# Patient Record
Sex: MALE | Race: WHITE | NOT HISPANIC OR LATINO | URBAN - METROPOLITAN AREA
[De-identification: names, ages, dates, MRNs, and addresses within clinical notes are randomized per-mention and may not be internally consistent; named-entity substitution may affect disease eponyms.]

---

## 2018-05-01 ENCOUNTER — OFFICE VISIT CONVERTED (OUTPATIENT)
Dept: FAMILY MEDICINE CLINIC | Facility: CLINIC | Age: 58
End: 2018-05-01
Attending: FAMILY MEDICINE

## 2019-06-27 ENCOUNTER — OFFICE VISIT CONVERTED (OUTPATIENT)
Dept: FAMILY MEDICINE CLINIC | Facility: CLINIC | Age: 59
End: 2019-06-27
Attending: NURSE PRACTITIONER

## 2020-01-15 ENCOUNTER — HOSPITAL ENCOUNTER (OUTPATIENT)
Dept: URGENT CARE | Facility: CLINIC | Age: 60
Discharge: HOME | End: 2020-01-15
Attending: NURSE PRACTITIONER

## 2020-02-07 ENCOUNTER — OFFICE VISIT CONVERTED (OUTPATIENT)
Dept: FAMILY MEDICINE CLINIC | Facility: CLINIC | Age: 60
End: 2020-02-07
Attending: NURSE PRACTITIONER

## 2020-02-07 ENCOUNTER — HOSPITAL ENCOUNTER (OUTPATIENT)
Dept: GENERAL RADIOLOGY | Facility: HOSPITAL | Age: 60
Discharge: HOME | End: 2020-02-07
Attending: NURSE PRACTITIONER

## 2020-02-07 ENCOUNTER — CONVERSION ENCOUNTER (OUTPATIENT)
Dept: FAMILY MEDICINE CLINIC | Facility: CLINIC | Age: 60
End: 2020-02-07

## 2020-07-15 ENCOUNTER — OFFICE VISIT CONVERTED (OUTPATIENT)
Dept: FAMILY MEDICINE CLINIC | Facility: CLINIC | Age: 60
End: 2020-07-15
Attending: NURSE PRACTITIONER

## 2021-05-13 NOTE — PROGRESS NOTES
Progress Note      Patient Name: Curtis Pereyra Sr   Patient ID: [id number]   Sex: Male   YOB: 1960    Primary Care Provider: Donita FRANCOIS   Referring Provider: Donita FRANCOIS    Visit Date: July 15, 2020    Provider: PRISCILA Fatima   Location: [facility]   Location Address: [address] [place] [address], [address]  [place], [place]  [id number]   Location Phone: [phone]          Chief Complaint  · ANNUAL F/U      History Of Present Illness  Curtis Pereyra Sr is a 60 year old /White male who presents for evaluation and treatment of:      Patient is here today for an annual follow up. Says no c/c.    refused colonoscopy- willing to do cologuard    mood-doing good on current dose of effexor    htn-he does not check his b/p at home-running excellent today    vit b12/vit b6 -started initially for depression- has been taking 5-10 yrs    hearing test-states he is getting hearing aides from [facility]-states it was 1400 for 2. Hx of acostic neuroma left ear. Neurosurgery wanted to perform surgery on the left ear but he refused at the time. States he has severe hearing loss in the left ear.    he did not get labs drawn last year-discussed importance of getting labs done-states he will get the labs ordered done.    pt deferred depression screening today.       Past Medical History  Disease Name Date Onset Notes   Anxiety --  --    Depression --  --    Elevated PSA 11/13/2014 --    Hypertension --  --          Past Surgical History  Procedure Name Date Notes   Colonoscopy 10/20/2006 DIVERTICULOSIS   Hernia 4 yrs old --          Medication List  Name Date Started Instructions   Mobic 15 mg oral tablet 07/15/2020 TAKE ONE TABLET BY MOUTH DAILY FOR JOINT / FOOT PAIN   venlafaxine 225 mg oral tablet extended release 24hr 07/15/2020 TAKE ONE TABLET BY MOUTH EVERY MORNING AT THE SAME TIME EACH DAY ---TAKE WITH FOOD   Vitamin B-6 200 mg Oral tablet  take 1 tablet by oral route daily   vitamin B12 Oral 1000  "mcg  1 po per day   Zestoretic 20-25 mg oral tablet 07/15/2020 TAKE ONE TABLET BY MOUTH DAILY         Allergy List  Allergen Name Date Reaction Notes   NO KNOWN DRUG ALLERGIES --  --  --        Allergies Reconciled  Family Medical History  Disease Name Relative/Age Notes   Stroke Father/   --    Heart Disease Father/  Uncle/   --    Diabetes, unspecified type Father/   --          Social History  Finding Status Start/Stop Quantity Notes   Active but no formal exercise --  --/-- --  --    Alcohol Never --/-- --  07/15/2020 - 02/07/2020 - 06/27/2019 -     --  --/-- --  --    No known infection risk --  --/-- --  --    Tobacco Never --/-- --  --          Immunizations  NameDate Admin Mfg Trade Name Lot Number Route Inj VIS Given VIS Publication   Tdap01/01/2014 NE Not Entered  NE NE 05/07/2019    Comments: PER PATIENT         Review of Systems  · Constitutional  o Denies  o : fever, fatigue, weight loss, weight gain  · Cardiovascular  o Denies  o : lower extremity edema, claudication, chest pressure, palpitations  · Respiratory  o Denies  o : shortness of breath, wheezing, cough, hemoptysis, dyspnea on exertion  · Gastrointestinal  o Denies  o : nausea, vomiting, diarrhea, constipation, abdominal pain      Vitals  Date Time BP Position Site L\R Cuff Size HR RR TEMP (F) WT  HT  BMI kg/m2 BSA m2 O2 Sat        07/15/2020 08:41 /72 Sitting    83 - R   325lbs 6oz 6'  5\" 38.58 2.83 98 %          Physical Examination  · Constitutional  o Appearance  o : alert, in no acute distress, well developed, well-nourished  · Neck  o Thyroid  o : no thyomegaly, no palpabale masses   · Respiratory  o Auscultation of Lungs  o : normal breath sounds throughout, no wheeze, rhonchi, or crackles  · Cardiovascular  o Heart  o : Regular rate and rhythm, Normal S1,S2 , No cardiac murmers, No S3 or S4 gallop or rubs  · Skin and Subcutaneous Tissue  o General Inspection  o : no rashes, normal skin color, warm and dry  o Digits and " Nails  o : no clubbing, cyanosis, deformities or edema present, normal appearing nails  · Neurologic  o Mental Status Examination  o : alert and oriented to time, place, and person. Gait and Station: normal gait, able to stand without difficulty  · Psychiatric  o Judgement and Insight  o : judgment and insight intact  o Mood and Affect  o : normal mood and affect          Assessment  · Depression     311/F32.9  mood-doing good on current dose of Effexor-refilled today.  · Obesity     278.00/E66.9  20lb wt gain since last visit-discussed diet, exercise and wt loss.  · Vitamin D deficiency     268.9/E55.9  · Screening for depression     V79.0/Z13.89  · Screening for lipid disorders     V77.91/Z13.220  · Screening for colon cancer     V76.51/Z12.11  · Screening for prostate cancer     V76.44/Z12.5  · Anxiety     300.02/F41.1  · Hypertension     401.9/I10  b/p well controlled on zestoretic-refilled today.  · B12 deficiency     266.2/E53.8  · Screening for thyroid disorder     V77.0/Z13.29  · Routine lab draw     V72.60/Z01.89  · Hearing loss     389.9/H91.90  he had a hearing test done yesterday-he is going to purchase hearing aides. He has severe hearing loss in the left ear due to acoustic neuroma.  · Acoustic neuroma     225.1/D33.3      Plan  · Orders  o Cologuard (95021) - V76.51/Z12.11 - 07/15/2020  o Male Physical Primary Care Panel (CMP, CBC, TSH, Lipid, PSA) [facility] ([id number], 92151, 08977, 85516, 62942, ) - V77.91/Z13.220, V77.0/Z13.29, V72.60/Z01.89 - 07/15/2020  o Vitamin D (25-Hydroxy) Level (15444) - 268.9/E55.9 - 07/15/2020  o ACO-39: Current medications updated and reviewed () - - 07/15/2020  o ACO-14: Influenza immunization was not administered for reasons documented () - - 07/15/2020  o B12 Folate levels (B12FO) - 266.2/E53.8 - 07/15/2020  · Medications  o Mobic 15 mg oral tablet   SIG: TAKE ONE TABLET BY MOUTH DAILY FOR JOINT / FOOT PAIN   DISP: (30) Tablet with 5 refills  Adjusted on  07/15/2020     o venlafaxine 225 mg oral tablet extended release 24hr   SIG: TAKE ONE TABLET BY MOUTH EVERY MORNING AT THE SAME TIME EACH DAY ---TAKE WITH FOOD   DISP: (30) Unspecified with 5 refills  Adjusted on 07/15/2020     o Zestoretic 20-25 mg oral tablet   SIG: TAKE ONE TABLET BY MOUTH DAILY   DISP: (30) Tablet with 5 refills  Adjusted on 07/15/2020     o aspirin 81 mg Oral tablet,delayed release (DR/EC)   SIG: take 1 tablet (81 mg) by oral route once daily   DISP: (0) tablet with 0 refills  Discontinued on 07/15/2020     o ProAir HFA 90 mcg/actuation inhalation HFA aerosol inhaler   SIG: inhale 1 - 2 puffs (90 - 180 mcg) by inhalation route every 6 hours as needed   DISP: (1) 8.5 gm canister with 0 refills  Discontinued on 07/15/2020     o Medications have been Reconciled  o Transition of Care or Provider Policy  · Instructions  o Depression Screen completed and scanned into the EMR under the designated folder within the patient's documents.  o Take all medications as prescribed/directed.  o Patient was educated/instructed on their diagnosis, treatment and medications prior to discharge from the clinic today.  o Call the office with any concerns or questions.  o Discussed Covid-19 precautions including, but not limited to, social distancing, avoid touching your face, and hand washing.   o 1 year follow up  o 6 month follow up            Electronically Signed by: PRISCILA Fatima -Author on July 15, 2020 09:22:28 AM

## 2021-05-15 VITALS
BODY MASS INDEX: 37.19 KG/M2 | WEIGHT: 315 LBS | DIASTOLIC BLOOD PRESSURE: 72 MMHG | HEART RATE: 83 BPM | OXYGEN SATURATION: 98 % | HEIGHT: 77 IN | SYSTOLIC BLOOD PRESSURE: 125 MMHG

## 2021-05-15 VITALS
WEIGHT: 315 LBS | OXYGEN SATURATION: 94 % | BODY MASS INDEX: 37.19 KG/M2 | HEIGHT: 77 IN | HEART RATE: 84 BPM | DIASTOLIC BLOOD PRESSURE: 77 MMHG | SYSTOLIC BLOOD PRESSURE: 144 MMHG

## 2021-05-15 VITALS
DIASTOLIC BLOOD PRESSURE: 71 MMHG | OXYGEN SATURATION: 98 % | SYSTOLIC BLOOD PRESSURE: 117 MMHG | BODY MASS INDEX: 36.28 KG/M2 | WEIGHT: 307.25 LBS | HEIGHT: 77 IN | HEART RATE: 78 BPM

## 2021-05-16 VITALS
WEIGHT: 315 LBS | DIASTOLIC BLOOD PRESSURE: 77 MMHG | BODY MASS INDEX: 37.19 KG/M2 | HEIGHT: 77 IN | SYSTOLIC BLOOD PRESSURE: 108 MMHG | HEART RATE: 75 BPM

## 2024-10-06 PROCEDURE — 87086 URINE CULTURE/COLONY COUNT: CPT

## 2024-10-31 ENCOUNTER — OFFICE VISIT (OUTPATIENT)
Dept: UROLOGY | Age: 64
End: 2024-10-31
Payer: MEDICARE

## 2024-10-31 VITALS
HEIGHT: 77 IN | BODY MASS INDEX: 37.19 KG/M2 | SYSTOLIC BLOOD PRESSURE: 172 MMHG | WEIGHT: 315 LBS | DIASTOLIC BLOOD PRESSURE: 85 MMHG

## 2024-10-31 DIAGNOSIS — R31.9 HEMATURIA, UNSPECIFIED TYPE: Primary | ICD-10-CM

## 2024-10-31 DIAGNOSIS — R31.0 GROSS HEMATURIA: ICD-10-CM

## 2024-10-31 PROBLEM — I10 ESSENTIAL HYPERTENSION: Status: ACTIVE | Noted: 2021-03-19

## 2024-10-31 PROBLEM — E66.9 OBESITY: Status: ACTIVE | Noted: 2021-03-19

## 2024-10-31 PROBLEM — R06.02 SHORTNESS OF BREATH: Status: ACTIVE | Noted: 2021-03-19

## 2024-10-31 LAB
BACTERIA UR QL AUTO: ABNORMAL /HPF
BILIRUB BLD-MCNC: NEGATIVE MG/DL
CLARITY, POC: CLEAR
COLOR UR: YELLOW
EXPIRATION DATE: ABNORMAL
GLUCOSE UR STRIP-MCNC: NEGATIVE MG/DL
HYALINE CASTS UR QL AUTO: ABNORMAL /LPF
KETONES UR QL: NEGATIVE
LEUKOCYTE EST, POC: NEGATIVE
Lab: ABNORMAL
NITRITE UR-MCNC: NEGATIVE MG/ML
PH UR: 5.5 [PH] (ref 5–8)
PROT UR STRIP-MCNC: NEGATIVE MG/DL
RBC # UR STRIP: ABNORMAL /HPF
RBC # UR STRIP: ABNORMAL /UL
REF LAB TEST METHOD: ABNORMAL
SP GR UR: 1.02 (ref 1–1.03)
SQUAMOUS #/AREA URNS HPF: ABNORMAL /HPF
URINE VOLUME: NORMAL
UROBILINOGEN UR QL: ABNORMAL
WBC # UR STRIP: ABNORMAL /HPF

## 2024-10-31 PROCEDURE — 81015 MICROSCOPIC EXAM OF URINE: CPT | Performed by: NURSE PRACTITIONER

## 2024-10-31 NOTE — PROGRESS NOTES
Chief Complaint: Blood in Urine    Subjective         History of Present Illness  Curtis Pereyra is a 64 y.o. male presents to [facility] to be seen for gross hematuria.    Patient was seen in the urgent care with [facility] on 10/6/2024 with complaints of gross hematuria. The patient stated that he had labs the day before and his urine had some discoloration to it however he thought it was concentrated because he had not been drinking enough.  He apparently stated that his symptoms continued and he had informed his wife who is a retired nurse and she informed him that there was blood in the urine and told him that he needed to be seen.  Next  When he was seen at the urgent care center it was documented the patient had 3+ blood in his urine and trace protein he denied any fever chills flank or CVA pain as well as no lower abdominal pain no groin or testicular pain or discomfort was noted.      The patient does have a history of BPH and is currently taking tamsulosin for this.    He was apparently told during a colonoscopy that his prostate was normal in size.    After his evaluation at urgent care he was urged to contact his primary care provider to place a referral for his hematuria.    He states he has not seen blood in the urine for the last 3 weeks.     He states that he has some urgency with urination.     He reports frequency with urination.     He states his stream is better after starting tamsulosin.     Denies straining.    He has no hx of renal stones.    He states that his PCP ordered a ct scan which was performed at [facility] since this occurred.    We were able to pull the report for his CT scan at [facility] that was performed on 10/10/2024 CT scan revealed right kidney containing an 11 mm cyst in the midportion Bosniak 1 category.  The left kidney contains a 12 mm cyst in its midportion also Bosniak 1 category.  There are 2 subcentimeter cyst in lower pole of  left kidney that are not suspicious and there is a subcentimeter cyst in the midportion of the left kidney that is not suspicious.  There is a 9 mm cyst on the left that is Bosniak 1 category and is not suspicious.  No suspicious renal masses are seen.  Delayed imaging in the expiratory phase shows no intra calyceal filling defects.  The ureters are opacified along most of their course with no ureteral masses noted.  There is no evidence of retroperitoneal adenopathy.  No distended bowel loops are seen.  Prostatic enlargement is seen and there are bladder trabeculations noted.  Bladder wall thickening is somewhat greater posteriorly and laterally on the left compared to the right.  Consider direct visualization with cystoscopy to rule out a left bladder wall mass.  Prostatic enlargement with neurogenic bladder.  Asymmetric bladder wall thickening posterior laterally on the left.    No family hx of gu malignancies.     He is a non smoker.            Objective     Past Medical History:   Diagnosis Date    Anxiety     BPH (benign prostatic hyperplasia)     Erectile dysfunction     Hypertension     Urinary incontinence Oct 24       Past Surgical History:   Procedure Laterality Date    COLONOSCOPY           Current Outpatient Medications:     lisinopril-hydrochlorothiazide (PRINZIDE,ZESTORETIC) 20-25 MG per tablet, Take 1 tablet by mouth Daily., Disp: , Rfl:     meloxicam (MOBIC) 15 MG tablet, Take 1 tablet by mouth Daily., Disp: , Rfl:     pyridoxine (VITAMIN B-6) 25 MG tablet, Take 1 tablet by mouth Daily., Disp: , Rfl:     tamsulosin (FLOMAX) 0.4 MG capsule 24 hr capsule, Take 1 capsule by mouth Daily., Disp: , Rfl:     venlafaxine (EFFEXOR) 75 MG tablet, Take 3 tablets by mouth Daily., Disp: , Rfl:     venlafaxine XR (EFFEXOR-XR) 75 MG 24 hr capsule, Take 3 capsules by mouth Daily., Disp: , Rfl:     vitamin B-12 (cyanocobalamin) 100 MCG tablet, Take 1 tablet by mouth Daily., Disp: , Rfl:     No Known Allergies  "    Family History   Problem Relation Age of Onset    Heart disease Father        Social History     Socioeconomic History    Marital status:    Tobacco Use    Smoking status: Former     Types: Cigarettes    Smokeless tobacco: Never   Vaping Use    Vaping status: Never Used   Substance and Sexual Activity    Alcohol use: Never    Drug use: Never    Sexual activity: Not Currently     Partners: Male     Birth control/protection: None       Vital Signs:   /85   Ht 195.6 cm (77\")   Wt (!) 146 kg (321 lb)   BMI 38.07 kg/m²      Physical Exam     Result Review :   The following data was reviewed by: PRISCILA Salcedo on 10/31/2024:  Results for orders placed or performed in visit on 10/31/24   POC Urinalysis Dipstick, Automated    Collection Time: 10/31/24  1:09 PM    Specimen: Urine   Result Value Ref Range    Color Yellow Yellow, Straw, Dark Yellow, Danay    Clarity, UA Clear Clear    Specific Gravity  1.025 1.005 - 1.030    pH, Urine 5.5 5.0 - 8.0    Leukocytes Negative Negative    Nitrite, UA Negative Negative    Protein, POC Negative Negative mg/dL    Glucose, UA Negative Negative mg/dL    Ketones, UA Negative Negative    Urobilinogen, UA 0.2 E.U./dL Normal, 0.2 E.U./dL    Bilirubin Negative Negative    Blood, UA Moderate (A) Negative    Lot Number [id number]     Expiration Date 92,025    Bladder Scan    Collection Time: 10/31/24  2:16 PM   Result Value Ref Range    Urine Volume 106ml         Bladder Scan interpretation 10/31/2024    Estimation of residual urine via BVI 3000 Verathon Bladder Scan  MA/nurse performing: polly ruiz Ma   Residual Urine: 106 ml  Indication: Hematuria, unspecified type    Gross hematuria   Position: Supine  Examination: Incremental scanning of the suprapubic area using 2.0 MHz transducer using copious amounts of acoustic gel.   Findings: An anechoic area was demonstrated which represented the bladder, with measurement of residual urine as noted. I inspected this myself. In " that the residual urine was stable or insignificant, refer to plan for treatment and plan necessary at this time.          Procedures        Assessment and Plan    Diagnoses and all orders for this visit:    1. Hematuria, unspecified type (Primary)  -     POC Urinalysis Dipstick, Automated  -     Urinalysis, Microscopic Only - Urine, Clean Catch; Future  -     Urinalysis, Microscopic Only - Urine, Clean Catch  -     Bladder Scan    2. Gross hematuria  -     Cystoscopy; Future      Discussed with the patient his symptoms of gross hematuria are not identified on CT imaging.  Did recommend that the patient undergo a cystoscopy.    Will place order for cystoscopy to be performed urgently given patient's gross hematuria and potential bladder mass noted on CT scan.    Did discuss with the patient at this time technically his upper urinary tract evaluation is negative for any malignancies and his CT scan did not identify specific cause of his symptoms however he may benefit from an outlet procedure if he is with continued bothersome urinary symptoms.      I spent 15 minutes caring for Curtis on this date of service. This time includes time spent by me in the following activities:reviewing tests, obtaining and/or reviewing a separately obtained history, performing a medically appropriate examination and/or evaluation , counseling and educating the patient/family/caregiver, ordering medications, tests, or procedures, and documenting information in the medical record  Follow Up   Return for With Dr. David for Cystoscopy.  Patient was given instructions and counseling regarding his condition or for health maintenance advice. Please see specific information pulled into the AVS if appropriate.         This document has been electronically signed by PRISCILA Salcedo  October 31, 2024 14:18 EDT

## 2024-11-04 ENCOUNTER — TELEPHONE (OUTPATIENT)
Dept: UROLOGY | Age: 64
End: 2024-11-04
Payer: COMMERCIAL

## 2024-11-04 DIAGNOSIS — R31.0 GROSS HEMATURIA: Primary | ICD-10-CM

## 2024-11-04 RX ORDER — SULFAMETHOXAZOLE AND TRIMETHOPRIM 800; 160 MG/1; MG/1
TABLET ORAL
Qty: 6 TABLET | Refills: 0 | Status: SHIPPED | OUTPATIENT
Start: 2024-11-04

## 2024-11-04 NOTE — TELEPHONE ENCOUNTER
PATIENT SAID HE WAS SCHEDULED FOR A PROCEDURE WITH DR. CHAUHAN ON WEDNESDAY, AND WAS TOLD AN ANTIBIOTIC WOULD BE SENT IN TO START PRIOR.  HE SAID NOTHING HAS BEEN SENT TO [facility].    #[phone]

## 2024-11-11 ENCOUNTER — TELEPHONE (OUTPATIENT)
Dept: UROLOGY | Age: 64
End: 2024-11-11
Payer: COMMERCIAL

## 2024-11-13 ENCOUNTER — PROCEDURE VISIT (OUTPATIENT)
Dept: UROLOGY | Age: 64
End: 2024-11-13
Payer: MEDICARE

## 2024-11-13 ENCOUNTER — PREP FOR SURGERY (OUTPATIENT)
Dept: OTHER | Facility: HOSPITAL | Age: 64
End: 2024-11-13
Payer: COMMERCIAL

## 2024-11-13 VITALS — BODY MASS INDEX: 37.19 KG/M2 | WEIGHT: 315 LBS | HEIGHT: 77 IN

## 2024-11-13 DIAGNOSIS — R31.0 GROSS HEMATURIA: ICD-10-CM

## 2024-11-13 DIAGNOSIS — R31.0 GROSS HEMATURIA: Primary | ICD-10-CM

## 2024-11-13 DIAGNOSIS — N32.89 BLADDER MASS: Primary | ICD-10-CM

## 2024-11-13 NOTE — PROGRESS NOTES
Preprocedure diagnosis  Gross hematuria    Postprocedure diagnosis  Gross hematuria, bladder mass    Procedure  Flexible Cystourethroscopy    Attending surgeon  Sarah David MD    Anesthesia  2% lidocaine jelly intraurethrally    Complications  None    Indications  64 y.o. male undergoing a flexible cystoscopy for the above mentioned indications.    Notes some lower urinary tract symptoms, prescribed Flomax which she does say is helping.  Does note significant urinary urgency at times.  Informed consent was obtained.      Findings  Prostatomegaly with bilateral coapting lateral lobes; no significant median lobe; some discomfort with deflection of the cystoscope; cystoscopy revealed papillary bladder tumor with surrounding mucosal irregularity at the left bladder base and lateral wall: No other evident abnormality on today's evaluation    Procedure  The patient was placed in supine position and prepped and draped in sterile fashion with lidocaine jelly per urethra for anesthesia.  A timeout was performed.  The 14F flexible cystoscope was lubricated and gently placed through the urethra and into the bladder.  The bladder was completely visualized.  The cystoscope was retroflexed and the bladder neck visualized. Findings were as above. The scope was withdrawn and the procedure terminated.  The patient tolerated the procedure well.        Plan:  Tolerated procedure well.  Instructions provided.  Cystoscopic findings discussed with patient include findings of bladder mass, suspicious for malignancy  Recommend proceeding to OR for cystoscopy, transurethral resection of bladder tumor  Risk, benefits, and alternatives of this procedure were discussed with him at length.  Risk including but not limited to bleeding, infection, damage surrounding structure, pain, need for further procedures or management, worsening lower urinary tract symptoms, and risk of anesthesia including up to death.  Perioperative and postoperative  expectations were also discussed with him including the possibility of going home with a catheter.  Patient participated the discussion, notes understanding and agreeable to proceed.  Schedule OR  All questions addressed

## 2024-11-14 NOTE — PRE-PROCEDURE INSTRUCTIONS
PATIENT INSTRUCTED TO BE:    - NOTHING TO EAT AFTER MIDNIGHT OR CHEW, EXCEPT CAN HAVE CLEAR LIQUIDS 2 HOURS PRIOR TO SURGERY ARRIVAL TIME , NO MORE THAN 8 OZ. (NOTHING RED)     - TO HOLD ALL VITAMINS, SUPPLEMENTS, NSAIDS FOR ONE WEEK PRIOR TO THEIR SURGICAL PROCEDURE        - BATHING INSTRUCTIONS GIVEN    INSTRUCTED NO LOTIONS, JEWELRY, PIERCINGS,  NAIL POLISH, OR DEODORANT DAY OF SURGERY        -INSTRUCTED TO TAKE THE FOLLOWING MEDICATIONS THE DAY OF SURGERY WITH SIPS OF WATER:   Effexor        - DO NOT BRING ANY MEDICATIONS WITH YOU TO THE HOSPITAL THE DAY OF SURGERY, EXCEPT IF USE INHALERS. BRING INHALERS DAY OF SURGERY       - BRING CPAP OR BIPAP TO THE HOSPITAL ONLY IF YOU ARE SPENDING THE NIGHT    - DO NOT SMOKE OR VAPE 24 HOURS PRIOR TO PROCEDURE PER ANESTHESIA REQUEST     -MAKE SURE YOU HAVE A RIDE HOME OR SOMEONE TO STAY WITH YOU THE DAY OF THE PROCEDURE AFTER YOU GO HOME     - FOLLOW ANY OTHER INSTRUCTIONS GIVEN TO YOU BY YOUR SURGEON'S OFFICE.     - COME TO ELEVATOR A, THIRD FLOOR, CHECK IN AT THE DESK FOR REGISTRATION/SURGERY   - YOU WILL RECEIVE A PHONE CALL THE DAY PRIOR TO SURGERY BETWEEN 1PM AND 4 PM WITH ARRIVAL TIME, IF YOUR SURGERY IS ON A MONDAY YOU WILL RECEIVE A CALL THE FRIDAY PRIOR TO SURGERY DATE    - BRING CASH OR CREDIT CARD FOR COPAYMENT OF MEDICATIONS AFTER SURGERY IF YOU USE THE HOSPITAL PHARMACY (MEDS TO BED)    - PREADMISSION TESTING NURSE KAMARI CHIRINOS [phone] IF HAVE ANY QUESTIONS     -PATIENT PROVIDED THE NUMBER FOR PREOP SURGICAL DEPT IF HAD QUESTIONS AFTER HOURS PRIOR TO SURGERY ([phone] .  INFORMED PT IF NO ANSWER, LEAVE A MESSAGE AND SOMEONE WILL RETURN THEIR CALL       PATIENT VERBALIZED UNDERSTANDING       Clear Liquid Diet        Find out when you need to start a clear liquid diet.   Think of “clear liquids” as anything you could read a newspaper through. This includes things like water, broth, sports drinks, or tea WITHOUT any kind of milk or cream.           Once  you are told to start a clear liquid diet, only drink these things until 2 hours before arrival to the hospital or when the hospital says to stop. Total volume limitation: 8 oz.       Clear liquids you CAN drink:   Water   Clear broth: beef, chicken, vegetable, or bone broth with nothing in it   Gatorade   Lemonade or Melchor-aid   Soda   Tea, coffee (NO cream or honey)   Jell-O (without fruit)   Popsicles (without fruit or cream)   Italian ices   Juice without pulp: apple, white, grape   You may use salt, pepper, and sugar  NO RED  NO NOODLES    Do NOT drink:   Milk or cream   Soy milk, almond milk, coconut milk, or other non-dairy drinks and   creamers   Milkshakes or smoothies   Tomato juice   Orange juice   Grapefruit juice   Cream soups or any other than broth         Clear Liquid Diet:  Do NOT eat any solid food.  Do NOT eat or suck on mints or candy.  Do NOT chew gum.  Do NOT drink thick liquids like milk or juice with pulp in it.  Do NOT add milk, cream, or anything like soy milk or almond milk to coffee or tea.

## 2024-11-15 ENCOUNTER — ANESTHESIA (OUTPATIENT)
Dept: PERIOP | Facility: HOSPITAL | Age: 64
End: 2024-11-15
Payer: MEDICARE

## 2024-11-15 ENCOUNTER — ANESTHESIA EVENT (OUTPATIENT)
Dept: PERIOP | Facility: HOSPITAL | Age: 64
End: 2024-11-15
Payer: MEDICARE

## 2024-11-15 ENCOUNTER — HOSPITAL ENCOUNTER (OUTPATIENT)
Facility: HOSPITAL | Age: 64
Setting detail: HOSPITAL OUTPATIENT SURGERY
Discharge: HOME | End: 2024-11-15
Attending: UROLOGY | Admitting: UROLOGY
Payer: MEDICARE

## 2024-11-15 VITALS
TEMPERATURE: 97 F | WEIGHT: 315 LBS | SYSTOLIC BLOOD PRESSURE: 167 MMHG | DIASTOLIC BLOOD PRESSURE: 94 MMHG | HEIGHT: 77 IN | RESPIRATION RATE: 20 BRPM | BODY MASS INDEX: 37.19 KG/M2 | HEART RATE: 91 BPM | OXYGEN SATURATION: 96 %

## 2024-11-15 DIAGNOSIS — R31.0 GROSS HEMATURIA: ICD-10-CM

## 2024-11-15 DIAGNOSIS — N32.89 BLADDER MASS: ICD-10-CM

## 2024-11-15 LAB
ANION GAP SERPL CALCULATED.3IONS-SCNC: 11.4 MMOL/L (ref 5–15)
BUN SERPL-MCNC: 19 MG/DL (ref 8–23)
BUN/CREAT SERPL: 20.7 (ref 7–25)
CALCIUM SPEC-SCNC: 9.2 MG/DL (ref 8.6–10.5)
CHLORIDE SERPL-SCNC: 104 MMOL/L (ref 98–107)
CO2 SERPL-SCNC: 25.6 MMOL/L (ref 22–29)
CREAT SERPL-MCNC: 0.92 MG/DL (ref 0.76–1.27)
EGFRCR SERPLBLD CKD-EPI 2021: 91.7 ML/MIN/1.73
GLUCOSE SERPL-MCNC: 114 MG/DL (ref 65–99)
POTASSIUM SERPL-SCNC: 4 MMOL/L (ref 3.5–5.2)
QT INTERVAL: 407 MS
QTC INTERVAL: 430 MS
SODIUM SERPL-SCNC: 141 MMOL/L (ref 136–145)

## 2024-11-15 PROCEDURE — 25010000002 LIDOCAINE PF 2% 2 % SOLUTION: Performed by: NURSE ANESTHETIST, CERTIFIED REGISTERED

## 2024-11-15 PROCEDURE — 25010000002 DEXAMETHASONE PER 1 MG: Performed by: NURSE ANESTHETIST, CERTIFIED REGISTERED

## 2024-11-15 PROCEDURE — 88307 TISSUE EXAM BY PATHOLOGIST: CPT | Performed by: UROLOGY

## 2024-11-15 PROCEDURE — S0260 H&P FOR SURGERY: HCPCS | Performed by: UROLOGY

## 2024-11-15 PROCEDURE — 25010000002 SUGAMMADEX 200 MG/2ML SOLUTION: Performed by: NURSE ANESTHETIST, CERTIFIED REGISTERED

## 2024-11-15 PROCEDURE — 25010000002 CEFAZOLIN 3 G RECONSTITUTED SOLUTION 1 EACH VIAL: Performed by: UROLOGY

## 2024-11-15 PROCEDURE — 25810000003 LACTATED RINGERS PER 1000 ML: Performed by: STUDENT IN AN ORGANIZED HEALTH CARE EDUCATION/TRAINING PROGRAM

## 2024-11-15 PROCEDURE — 25010000002 PROPOFOL 10 MG/ML EMULSION: Performed by: NURSE ANESTHETIST, CERTIFIED REGISTERED

## 2024-11-15 PROCEDURE — 25010000002 MIDAZOLAM PER 1MG: Performed by: STUDENT IN AN ORGANIZED HEALTH CARE EDUCATION/TRAINING PROGRAM

## 2024-11-15 PROCEDURE — 52240 CYSTOSCOPY AND TREATMENT: CPT | Performed by: UROLOGY

## 2024-11-15 PROCEDURE — 80048 BASIC METABOLIC PNL TOTAL CA: CPT | Performed by: ANESTHESIOLOGY

## 2024-11-15 PROCEDURE — 25010000002 ONDANSETRON PER 1 MG: Performed by: NURSE ANESTHETIST, CERTIFIED REGISTERED

## 2024-11-15 PROCEDURE — 93005 ELECTROCARDIOGRAM TRACING: CPT | Performed by: ANESTHESIOLOGY

## 2024-11-15 PROCEDURE — 25010000002 FENTANYL CITRATE (PF) 50 MCG/ML SOLUTION: Performed by: NURSE ANESTHETIST, CERTIFIED REGISTERED

## 2024-11-15 RX ORDER — PHENAZOPYRIDINE HYDROCHLORIDE 200 MG/1
200 TABLET, FILM COATED ORAL 3 TIMES DAILY PRN
Qty: 20 TABLET | Refills: 0 | Status: SHIPPED | OUTPATIENT
Start: 2024-11-15

## 2024-11-15 RX ORDER — PROPOFOL 10 MG/ML
VIAL (ML) INTRAVENOUS AS NEEDED
Status: DISCONTINUED | OUTPATIENT
Start: 2024-11-15 | End: 2024-11-15 | Stop reason: SURG

## 2024-11-15 RX ORDER — OXYBUTYNIN CHLORIDE 5 MG/1
5 TABLET ORAL 3 TIMES DAILY PRN
Qty: 30 TABLET | Refills: 0 | Status: SHIPPED | OUTPATIENT
Start: 2024-11-15

## 2024-11-15 RX ORDER — DEXAMETHASONE SODIUM PHOSPHATE 4 MG/ML
INJECTION, SOLUTION INTRA-ARTICULAR; INTRALESIONAL; INTRAMUSCULAR; INTRAVENOUS; SOFT TISSUE AS NEEDED
Status: DISCONTINUED | OUTPATIENT
Start: 2024-11-15 | End: 2024-11-15 | Stop reason: SURG

## 2024-11-15 RX ORDER — OXYCODONE AND ACETAMINOPHEN 5; 325 MG/1; MG/1
.5-2 TABLET ORAL EVERY 6 HOURS PRN
Qty: 14 TABLET | Refills: 0 | Status: SHIPPED | OUTPATIENT
Start: 2024-11-15

## 2024-11-15 RX ORDER — ROCURONIUM BROMIDE 10 MG/ML
INJECTION, SOLUTION INTRAVENOUS AS NEEDED
Status: DISCONTINUED | OUTPATIENT
Start: 2024-11-15 | End: 2024-11-15 | Stop reason: SURG

## 2024-11-15 RX ORDER — MEPERIDINE HYDROCHLORIDE 25 MG/ML
12.5 INJECTION INTRAMUSCULAR; INTRAVENOUS; SUBCUTANEOUS
Status: DISCONTINUED | OUTPATIENT
Start: 2024-11-15 | End: 2024-11-15 | Stop reason: HOSPADM

## 2024-11-15 RX ORDER — ONDANSETRON 2 MG/ML
4 INJECTION INTRAMUSCULAR; INTRAVENOUS ONCE AS NEEDED
Status: DISCONTINUED | OUTPATIENT
Start: 2024-11-15 | End: 2024-11-15 | Stop reason: HOSPADM

## 2024-11-15 RX ORDER — IBUPROFEN 600 MG/1
600 TABLET, FILM COATED ORAL EVERY 6 HOURS PRN
Status: DISCONTINUED | OUTPATIENT
Start: 2024-11-15 | End: 2024-11-15 | Stop reason: HOSPADM

## 2024-11-15 RX ORDER — ONDANSETRON 4 MG/1
4 TABLET, ORALLY DISINTEGRATING ORAL ONCE AS NEEDED
Status: DISCONTINUED | OUTPATIENT
Start: 2024-11-15 | End: 2024-11-15 | Stop reason: HOSPADM

## 2024-11-15 RX ORDER — ONDANSETRON 2 MG/ML
INJECTION INTRAMUSCULAR; INTRAVENOUS AS NEEDED
Status: DISCONTINUED | OUTPATIENT
Start: 2024-11-15 | End: 2024-11-15 | Stop reason: SURG

## 2024-11-15 RX ORDER — MAGNESIUM HYDROXIDE 1200 MG/15ML
LIQUID ORAL AS NEEDED
Status: DISCONTINUED | OUTPATIENT
Start: 2024-11-15 | End: 2024-11-15 | Stop reason: HOSPADM

## 2024-11-15 RX ORDER — ACETAMINOPHEN 500 MG
1000 TABLET ORAL ONCE
Status: COMPLETED | OUTPATIENT
Start: 2024-11-15 | End: 2024-11-15

## 2024-11-15 RX ORDER — PROMETHAZINE HYDROCHLORIDE 12.5 MG/1
12.5 TABLET ORAL ONCE AS NEEDED
Status: DISCONTINUED | OUTPATIENT
Start: 2024-11-15 | End: 2024-11-15 | Stop reason: HOSPADM

## 2024-11-15 RX ORDER — MIDAZOLAM HYDROCHLORIDE 2 MG/2ML
2 INJECTION, SOLUTION INTRAMUSCULAR; INTRAVENOUS ONCE
Status: COMPLETED | OUTPATIENT
Start: 2024-11-15 | End: 2024-11-15

## 2024-11-15 RX ORDER — OXYCODONE HYDROCHLORIDE 5 MG/1
5 TABLET ORAL
Status: DISCONTINUED | OUTPATIENT
Start: 2024-11-15 | End: 2024-11-15 | Stop reason: HOSPADM

## 2024-11-15 RX ORDER — PROMETHAZINE HYDROCHLORIDE 25 MG/1
25 SUPPOSITORY RECTAL ONCE AS NEEDED
Status: DISCONTINUED | OUTPATIENT
Start: 2024-11-15 | End: 2024-11-15 | Stop reason: HOSPADM

## 2024-11-15 RX ORDER — PROMETHAZINE HYDROCHLORIDE 12.5 MG/1
25 TABLET ORAL ONCE AS NEEDED
Status: DISCONTINUED | OUTPATIENT
Start: 2024-11-15 | End: 2024-11-15 | Stop reason: HOSPADM

## 2024-11-15 RX ORDER — LIDOCAINE HYDROCHLORIDE 20 MG/ML
INJECTION, SOLUTION EPIDURAL; INFILTRATION; INTRACAUDAL; PERINEURAL AS NEEDED
Status: DISCONTINUED | OUTPATIENT
Start: 2024-11-15 | End: 2024-11-15 | Stop reason: SURG

## 2024-11-15 RX ORDER — SODIUM CHLORIDE, SODIUM LACTATE, POTASSIUM CHLORIDE, CALCIUM CHLORIDE 600; 310; 30; 20 MG/100ML; MG/100ML; MG/100ML; MG/100ML
9 INJECTION, SOLUTION INTRAVENOUS CONTINUOUS PRN
Status: DISCONTINUED | OUTPATIENT
Start: 2024-11-15 | End: 2024-11-15 | Stop reason: HOSPADM

## 2024-11-15 RX ORDER — FENTANYL CITRATE 50 UG/ML
INJECTION, SOLUTION INTRAMUSCULAR; INTRAVENOUS AS NEEDED
Status: DISCONTINUED | OUTPATIENT
Start: 2024-11-15 | End: 2024-11-15 | Stop reason: SURG

## 2024-11-15 RX ORDER — ACETAMINOPHEN 325 MG/1
650 TABLET ORAL ONCE
Status: DISCONTINUED | OUTPATIENT
Start: 2024-11-15 | End: 2024-11-15 | Stop reason: HOSPADM

## 2024-11-15 RX ADMIN — ACETAMINOPHEN 1000 MG: 500 TABLET ORAL at 07:41

## 2024-11-15 RX ADMIN — FENTANYL CITRATE 100 MCG: 50 INJECTION, SOLUTION INTRAMUSCULAR; INTRAVENOUS at 07:46

## 2024-11-15 RX ADMIN — LIDOCAINE HYDROCHLORIDE 40 MG: 20 INJECTION, SOLUTION INTRAVENOUS at 07:46

## 2024-11-15 RX ADMIN — MIDAZOLAM HYDROCHLORIDE 2 MG: 1 INJECTION, SOLUTION INTRAMUSCULAR; INTRAVENOUS at 07:29

## 2024-11-15 RX ADMIN — SODIUM CHLORIDE, POTASSIUM CHLORIDE, SODIUM LACTATE AND CALCIUM CHLORIDE: 600; 310; 30; 20 INJECTION, SOLUTION INTRAVENOUS at 07:45

## 2024-11-15 RX ADMIN — ROCURONIUM BROMIDE 50 MG: 10 INJECTION, SOLUTION INTRAVENOUS at 07:46

## 2024-11-15 RX ADMIN — SUGAMMADEX 200 MG: 100 INJECTION, SOLUTION INTRAVENOUS at 08:45

## 2024-11-15 RX ADMIN — ONDANSETRON HYDROCHLORIDE 4 MG: 2 SOLUTION INTRAMUSCULAR; INTRAVENOUS at 08:11

## 2024-11-15 RX ADMIN — PROPOFOL 200 MG: 10 INJECTION, EMULSION INTRAVENOUS at 07:46

## 2024-11-15 RX ADMIN — DEXAMETHASONE SODIUM PHOSPHATE 4 MG: 4 INJECTION, SOLUTION INTRAMUSCULAR; INTRAVENOUS at 08:11

## 2024-11-15 RX ADMIN — SODIUM CHLORIDE 3000 MG: 9 INJECTION, SOLUTION INTRAVENOUS at 07:29

## 2024-11-15 NOTE — OP NOTE
Preoperative diagnosis  Gross hematuria, bladder tumor    Postoperative diagnosis  Gross hematuria, bladder tumor    Procedure performed  Rigid cystoscopy  Transurethral resection of bladder tumor,  >5cm ([id number])      Surgeon  Sarah David MD      Anesthesia  General    EBL  2 mL    Complications  None    Specimen  Bladder tumor for pathology (left base and lateral wall)      Findings  Cystoscopy revealed prostatomegaly with coapting lateral lobes, no significant median lobe with some intravesical prostate tissue; normal bladder capacity, no trabeculations or diverticula; normal appearing bladder mucosa other than at the left lateral trigone and wall, area of mucosal erythema with heaping of the tissue, 2 discrete papillary tumors; completely resected, total area of resection over 5 cm.  Resection adjacent to but did not involve the left ureteral orifice.  Left ureteral orifice appeared normal even at conclusion of resection; E fluxed to clear urine.    Indications  64 y.o. male agreed to undergo the above named procedure after discussion of the alternatives, risks and benefits.  He was found to have a bladder tumor on cystoscopy for work up of gross hematuria. Informed consent was obtained.      Procedure  After informed consent was obtained, the patient was taken back to the  operating suite where general anesthesia was administered.  Once the patient  was adequately anesthetized, he was placed in the dorsal lithotomy position.  His genitals were prepped and draped in the normal sterile fashion.  A rigid  cystoscope was inserted gently into the urethral meatus and passed along the  length of the urethra.  There were no strictures, stenosis, or bladder neck contracture;  the prostate was enlarged with bilateral coapting lateral lobes, some intravesical prostate tissue, no significant median lobe. Pan cystoscopy was performed in a 360-degree manner.  Upon inspection of the bladder, the patient's bladder was  noted to have normal capacity, no trabeculations or diverticula.  Left lateral trigone and base of bladder with discrete papillary tumor x 2 proximally 1-2 cm each with surrounding erythematous mucosa of irregular texture and heaping tissue.  This was the only area of abnormality.  Normal-appearing right and left ureteral orifices.   Once pan cystoscopy was completed, the cystoscope was removed and a resectoscope was inserted.  The  bladder tumors and irregular surrounding area were completely resected.  Muscle was noted to be in the resection bed after the completion of resection.  Hemostasis was performed throughout the resection with electrocautery. The  tumor was then irrigated out and passed for pathology.  Total area of resection and fulguration was approximately 6 cm.  Final inspection of the resection bed of the tumor revealed adequate  hemostasis.  The left ureteral orifice was carefully inspected after the resection and fulguration.  Again, the mucosa of the left ureteral orifice appeared normal.  Observation was performed and it was noted to effluxed clear urine.    Bladder tumor was passed off to pathology.  The scope was removed with the bladder remaining full and a 18-Kosovan Bose  catheter was inserted into the patient's bladder without difficulty and  secured with 10 mL in the balloon.  At this point the procedure was deemed  terminated.  The patient was placed back in the supine position, awoken from  anesthesia, and transferred to the PACU in good condition.    Sign:  Electronically signed by Sarah David MD, 11/15/24, 8:54 AM EST.

## 2024-11-15 NOTE — H&P
[facility]   Urology Preop H&P Note    Patient Name: Curtis Pereyra  : 1960  MRN: [id number]  Primary Care Physician:  Audelia Villafana APRN  Referring Physician: Sarah David MD  Date of admission: 11/15/2024    Subjective   Subjective     Reason for Consult/ Chief Complaint: Bladder mass [N32.89]  Gross hematuria [R31.0]    HPI:  Curtis Pereyra is a 64 y.o. male history ofBladder mass [N32.89]  Gross hematuria [R31.0] who presents for further management OR.  Presents for planned Procedure(s):  CYSTOSCOPY TRANSURETHRAL RESECTION OF BLADDER TUMOR  Plain text: Scope bladder, resect tumor;  .  Risk, benefits, and alternatives discussed with patient prior to today.All questions were addressed after providing time for discussion.  Patient denies significant changes since last visit.  No new complaints today.    Review of Systems   All systems were reviewed and negative except for the above  Personal History     Past Medical History:   Diagnosis Date    Anxiety     BPH (benign prostatic hyperplasia)     Erectile dysfunction     Hypertension     Urinary incontinence Oct 24       Past Surgical History:   Procedure Laterality Date    COLONOSCOPY         Family History: family history includes Heart disease in his father. Otherwise pertinent FHx was reviewed and not pertinent to current issue.    Social History:  reports that he has quit smoking. His smoking use included cigarettes. He has never used smokeless tobacco. He reports that he does not drink alcohol and does not use drugs.    Home Medications:  lisinopril-hydrochlorothiazide, meloxicam, pyridoxine, tamsulosin, venlafaxine XR, and vitamin B-12    Allergies:  No Known Allergies    Objective    Objective     Vitals:   Temp:  [97.9 °F (36.6 °C)] 97.9 °F (36.6 °C)  Heart Rate:  [68] 68  Resp:  [18] 18  BP: (140)/(81) 140/81    Physical Exam:   Constitutional: Awake, alert   Respiratory: Clear, nonlabored respirations    Cardiovascular: Regular  rate, no chest retractions   gastrointestinal: Appears soft, nontender     Results:    Assessment & Plan   Assessment / Plan     Brief Patient Summary:  Curtis Pereyra is a 64 y.o. male who     Active Hospital Problems:  Active Hospital Problems    Diagnosis     Bladder mass     Gross hematuria        Plan:   Proceed to the OR for planned procedure, Procedure(s):  CYSTOSCOPY TRANSURETHRAL RESECTION OF BLADDER TUMOR  Plain text: Scope bladder, resect tumor,  ,   Risk, benefits, and alternatives discussed with patient at length he is agreeable to proceed  All questions addressed      Electronically signed by Sarah David MD, 11/15/24, 7:14 AM EST.

## 2024-11-15 NOTE — DISCHARGE INSTRUCTIONS
DISCHARGE INSTRUCTIONS   TRANSURETHRAL RESECTION   OF BLADDER TUMOR            For your surgery you had:  [x] General anesthesia (you may have a sore throat for the first 24 hours)  You may experience dizziness, drowsiness, or lightheadedness for several hours following surgery.  Do not stay alone today or tonight.  Limit your activity for 24 hours.   You should not drive, operate machinery, drink alcohol, or sign legally binding documents for 24 hours or while you are taking pain medication.   Resume your diet slowly. Follow any special dietary instructions you may have been given by your doctor.     NOTIFY YOUR DOCTOR IF YOU EXPERIENCE ANY OF THE FOLLOWING:  Temperature greater than 101 degrees Fahrenheit  Shaking chills  Nausea, vomiting, and/or pain that is not controlled by prescribed medications  Increase in bleeding or bleeding that is excessive  If unable to reach your doctor, please go to the closest Emergency Room   You will have a catheter to drain your bladder. Catheters are usually removed in 24-48 hours. Wash around the catheter with soap and water and rinse well.   Drink 6 glasses of water a day for 3-4 days.   You may see blood in your urine for up to a week, there may be small blood clots. If so, increase the amount you are drinking.   If you are passing large clots, call your doctor.   Avoid lifting or straining for 4 weeks.   You may have burning, pain, and frequency of urination for 48 hours after catheter is removed. Call your doctor if it continues longer.       SPECIAL INSTRUCTIONS:  See Dr. David's instructions on After Visit Summary.      Last dose of pain medication given at:  Tylenol (1000mg) last at 7:41am. Do not exceed 4000mg of tylenol in a 24 hour period.

## 2024-11-15 NOTE — ANESTHESIA PREPROCEDURE EVALUATION
Anesthesia Evaluation     Patient summary reviewed and Nursing notes reviewed   no history of anesthetic complications:   NPO Solid Status: > 8 hours  NPO Liquid Status: > 2 hours           Airway   Mallampati: II  TM distance: >3 FB  Neck ROM: full  Possible difficult intubation  Dental - normal exam     Pulmonary - normal exam    breath sounds clear to auscultation  (+) ,sleep apnea  Cardiovascular - normal exam  Exercise tolerance: good (4-7 METS)    Rhythm: regular  Rate: normal    (+) hypertension well controlled      Neuro/Psych  (+) psychiatric history Anxiety  GI/Hepatic/Renal/Endo    (+) obesity    Musculoskeletal     Abdominal   (+) obese   Substance History - negative use     OB/GYN negative ob/gyn ROS         Other   arthritis,     ROS/Med Hx Other: PAT Nursing Notes unavailable.       Phys Exam Other:  Full Beard              Anesthesia Plan    ASA 3     general     (Patient understands anesthesia not responsible for dental damage.)  intravenous induction     Anesthetic plan, risks, benefits, and alternatives have been provided, discussed and informed consent has been obtained with: patient.    Use of blood products discussed with patient .    Plan discussed with CRNA.      CODE STATUS:

## 2024-11-15 NOTE — ANESTHESIA POSTPROCEDURE EVALUATION
Patient: Curtis QURESHI Sr Pereyra    Procedure Summary       Date: 11/15/24 Room / Location: [facility] OR  / [facility] MAIN OR    Anesthesia Start: 0742 Anesthesia Stop: 0858    Procedure: CYSTOSCOPY TRANSURETHRAL RESECTION OF BLADDER TUMOR  Plain text: Scope bladder, resect tumor Diagnosis:       Bladder mass      Gross hematuria      (Bladder mass [N32.89])      (Gross hematuria [R31.0])    Surgeons: Sarah David MD Provider: Rocky Ewing MD    Anesthesia Type: general ASA Status: 3            Anesthesia Type: general    Vitals  Vitals Value Taken Time   /78 11/15/24 0930   Temp 36.3 °C (97.3 °F) 11/15/24 0930   Pulse 93 11/15/24 0930   Resp 16 11/15/24 0930   SpO2 95 % 11/15/24 0930           Post Anesthesia Care and Evaluation    Patient location during evaluation: bedside  Patient participation: complete - patient participated  Level of consciousness: awake  Pain management: adequate    Airway patency: patent  PONV Status: none  Cardiovascular status: acceptable and stable  Respiratory status: acceptable  Hydration status: acceptable

## 2024-11-19 ENCOUNTER — TELEPHONE (OUTPATIENT)
Dept: UROLOGY | Age: 64
End: 2024-11-19
Payer: COMMERCIAL

## 2024-11-19 LAB
CYTO UR: NORMAL
LAB AP CASE REPORT: NORMAL
LAB AP CLINICAL INFORMATION: NORMAL
PATH REPORT.FINAL DX SPEC: NORMAL
PATH REPORT.GROSS SPEC: NORMAL

## 2024-11-19 NOTE — TELEPHONE ENCOUNTER
URINARY BLADDER TUMOR WITH HIGH GRADE PAPILLARY UROTHELIAL CARCINOMA, INVASIVE INTO LAMINA PROPRIA.

## 2024-11-20 ENCOUNTER — TELEPHONE (OUTPATIENT)
Dept: UROLOGY | Age: 64
End: 2024-11-20

## 2024-11-20 ENCOUNTER — OFFICE VISIT (OUTPATIENT)
Dept: UROLOGY | Age: 64
End: 2024-11-20
Payer: MEDICARE

## 2024-11-20 ENCOUNTER — PREP FOR SURGERY (OUTPATIENT)
Dept: OTHER | Facility: HOSPITAL | Age: 64
End: 2024-11-20
Payer: COMMERCIAL

## 2024-11-20 VITALS — RESPIRATION RATE: 18 BRPM | BODY MASS INDEX: 37.19 KG/M2 | WEIGHT: 315 LBS | HEIGHT: 77 IN

## 2024-11-20 DIAGNOSIS — C67.8 MALIGNANT NEOPLASM OF OVERLAPPING SITES OF BLADDER: Primary | ICD-10-CM

## 2024-11-20 DIAGNOSIS — R39.15 URINARY URGENCY: Primary | ICD-10-CM

## 2024-11-20 RX ORDER — SOLIFENACIN SUCCINATE 5 MG/1
5 TABLET, FILM COATED ORAL DAILY
Qty: 60 TABLET | Refills: 0 | Status: SHIPPED | OUTPATIENT
Start: 2024-11-20

## 2024-11-20 NOTE — PROGRESS NOTES
UROLOGY OFFICE FOLLOW UP NOTE    Subjective   HPI  Curtis QURESHI  oRddy is a 64 y.o. male.  Presents for follow-up after TURBT, 11/15/2024.  Removed Bose catheter prior to today's visit.    History of Present Illness   He is accompanied by his wife.    His catheter was removed this morning at 4:00 AM, and he has since urinated. He reports no presence of blood in his urine, although he did observe some clots in the catheter bag when he emptied it. He expresses a strong dislike for the catheter.    He is currently taking oxybutynin as needed for bladder spasms, but it does not alleviate his pain. He has resumed taking Flomax, which he had previously stopped while the catheter was in place.    Additionally, he mentions experiencing low back pain today, which he attributes to sitting at his desk for several hours.    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; muscularis propria present without invasion      Results for orders placed or performed during the hospital encounter of 11/15/24   Basic Metabolic Panel    Collection Time: 11/15/24  6:47 AM    Specimen: Blood   Result Value Ref Range    Glucose 114 (H) 65 - 99 mg/dL    BUN 19 8 - 23 mg/dL    Creatinine 0.92 0.76 - 1.27 mg/dL    Sodium 141 136 - 145 mmol/L    Potassium 4.0 3.5 - 5.2 mmol/L    Chloride 104 98 - 107 mmol/L    CO2 25.6 22.0 - 29.0 mmol/L    Calcium 9.2 8.6 - 10.5 mg/dL    BUN/Creatinine Ratio 20.7 7.0 - 25.0    Anion Gap 11.4 5.0 - 15.0 mmol/L    eGFR 91.7 >60.0 mL/min/1.73   ECG 12 Lead Pre-Op / Pre-Procedure    Collection Time: 11/15/24  7:00 AM   Result Value Ref Range    QT Interval 407 ms    QTC Interval 430 ms   Tissue Pathology Exam    Collection Time: 11/15/24  8:22 AM    Specimen: Urinary Bladder; Tissue   Result Value Ref Range    Case Report       Surgical Pathology Report                         Case: [id number]                                 "  Authorizing Provider:  Sarah David MD      Collected:           11/15/2024 08:22 AM          Ordering Location:     [facility] MAIN Received:            11/15/2024 10:53 AM                                 OR                                                                           Pathologist:           Onur Connelly MD                                                            Specimen:    Urinary Bladder, bladder tumor                                                             Clinical Information       Bladder mass  Gross hematuria      Final Diagnosis       Urinary bladder, tumor, transurethral resection:   - High grade papillary urothelial carcinoma, invasive into lamina propria   - Muscularis propria present, without invasion    REMARKS: The above positive (malignant) diagnosis was called to Judy WALLACE in Dr. TARAN David's  office at 14:22 EST on 11/19/2024 by Baron GILBERT      Gross Description       1. Urinary Bladder.  Received in formalin and labeled \"bladder tumor\" is a 1.5 cm aggregate of tan, friable soft tissue fragments.  The specimen is submitted in toto in 1 cassette.   JORDY      Microscopic Description       Microscopic examination performed.           Review of systems  A review of systems was performed, and positive findings are noted in the HPI.    Objective     Vital Signs:   Resp 18   Ht 195.6 cm (77.01\")   Wt (!) 146 kg (321 lb)   BMI 38.06 kg/m²       Physical exam  No acute distress, well-nourished  Awake alert and oriented  Mood normal; affect normal  Physical Exam      Bladder Scan interpretation 11/20/2024    Estimation of residual urine via [employer] 3000 Verathon Bladder Scan  Performed by: FILI Russ  Residual Urine: 0 ml  Indication: Malignant neoplasm of overlapping sites of bladder   Position: Supine  Examination: Incremental scanning of the suprapubic area using 2.0 MHz transducer using copious amounts of acoustic gel.   Findings: An anechoic area was demonstrated which " represented the bladder, with measurement of residual urine as noted. I inspected this myself. In that the residual urine was stable or insignificant, refer to plan for treatment and plan necessary at this time.     Problem List:  Patient Active Problem List   Diagnosis    Essential hypertension    Obesity    Shortness of breath    Bladder mass    Gross hematuria       Assessment & Plan   Diagnoses and all orders for this visit:    1. Malignant neoplasm of overlapping sites of bladder (Primary)      Emptying bladder without postvoid residual; after catheter removal    Clinically doing well after resection of bladder tumor    The patient and patient's family was counseled regarding diagnostic results, prognosis and risks and benefits of treatment options.         High Grade TI urothelial cancer. I have discussed the management of high grade T1 urothelial cancer of the bladder with the patient, including a 25-40% likelihood of detecting T2 or greater disease at re-resection. I have discussed the risks and prognosis of High grade TI disease with the patient including progression to MIBC and recurrence of HGTI.     I have discussed bladder preserving management with BCG and intravesical agents, and the risks of infection, bladder screening, recurrence and progression, as well as the need for maintenance with BCG.  Also discussed international shortage ongoing, issues with supply chain availability limiting available supply at times.    I have discussed the role of early cystectomy for patients with HGTI and the excellent overall survival for patient with HGT1 bladder cancer who have a cystectomy. I mentioned that even with HGT1 there is a 10-15% chance of being discovered to have ibrahima metastasis.      Discussed AUA guidelines including to proceed to the OR for repeat resection in 4-6 weeks.  Risk, benefits, and alternatives of this procedure were discussed with him at length.  Risk including but not limited to  bleeding, infection, damage surrounding structure, pain, need for further procedures or management, worsening lower urinary tract symptoms, and risk of anesthesia including up to death.  Perioperative and postoperative expectations were also discussed with him including the possibility of going home with a catheter.      Patient participated the discussion, notes understanding and agreeable to proceed.      Schedule OR  All questions addressed                 Assessment & Plan             Patient or patient representative verbalized consent for the use of Ambient Listening during the visit with  Sarah David MD for chart documentation. 11/20/2024  15:20 EST

## 2024-11-20 NOTE — TELEPHONE ENCOUNTER
Okay, I put an order for Vesicare to apothecary.  Possible side effects include dry mouth and worsening constipation.  Sent in the lower dose which is typically tolerated well.  Thank you

## 2024-11-20 NOTE — TELEPHONE ENCOUNTER
PT WAS SEEN IN THE OFFICE TODAY AND A MEDICATION FOR INCONTINENCE WAS OFFERED AND THE PATIENT DECLINED. HE HAS CHANGED HIS MIND AND WOULD LIKE TO TRY SOMETHING.

## 2024-11-21 ENCOUNTER — APPOINTMENT (OUTPATIENT)
Dept: CT IMAGING | Facility: HOSPITAL | Age: 64
End: 2024-11-21
Payer: MEDICARE

## 2024-11-21 ENCOUNTER — APPOINTMENT (OUTPATIENT)
Dept: GENERAL RADIOLOGY | Facility: HOSPITAL | Age: 64
End: 2024-11-21
Payer: MEDICARE

## 2024-11-21 ENCOUNTER — HOSPITAL ENCOUNTER (INPATIENT)
Facility: HOSPITAL | Age: 64
LOS: 2 days | Discharge: HOME | End: 2024-11-23
Attending: EMERGENCY MEDICINE | Admitting: INTERNAL MEDICINE
Payer: MEDICARE

## 2024-11-21 DIAGNOSIS — A41.9 SEPSIS, DUE TO UNSPECIFIED ORGANISM, UNSPECIFIED WHETHER ACUTE ORGAN DYSFUNCTION PRESENT: ICD-10-CM

## 2024-11-21 DIAGNOSIS — N39.0 URINARY TRACT INFECTION WITHOUT HEMATURIA, SITE UNSPECIFIED: Primary | ICD-10-CM

## 2024-11-21 DIAGNOSIS — N39.0 COMPLICATED UTI (URINARY TRACT INFECTION): ICD-10-CM

## 2024-11-21 LAB
ALBUMIN SERPL-MCNC: 4.3 G/DL (ref 3.5–5.2)
ALBUMIN/GLOB SERPL: 1.4 G/DL
ALP SERPL-CCNC: 89 U/L (ref 39–117)
ALT SERPL W P-5'-P-CCNC: 21 U/L (ref 1–41)
ANION GAP SERPL CALCULATED.3IONS-SCNC: 12.7 MMOL/L (ref 5–15)
AST SERPL-CCNC: 18 U/L (ref 1–40)
BACTERIA UR QL AUTO: ABNORMAL /HPF
BASOPHILS # BLD AUTO: 0.03 10*3/MM3 (ref 0–0.2)
BASOPHILS NFR BLD AUTO: 0.2 % (ref 0–1.5)
BILIRUB SERPL-MCNC: 0.7 MG/DL (ref 0–1.2)
BILIRUB UR QL STRIP: NEGATIVE
BUN SERPL-MCNC: 21 MG/DL (ref 8–23)
BUN/CREAT SERPL: 19.8 (ref 7–25)
CALCIUM SPEC-SCNC: 9.8 MG/DL (ref 8.6–10.5)
CHLORIDE SERPL-SCNC: 100 MMOL/L (ref 98–107)
CLARITY UR: ABNORMAL
CO2 SERPL-SCNC: 24.3 MMOL/L (ref 22–29)
COLOR UR: YELLOW
CREAT SERPL-MCNC: 1.06 MG/DL (ref 0.76–1.27)
D-LACTATE SERPL-SCNC: 2 MMOL/L (ref 0.5–2)
DEPRECATED RDW RBC AUTO: 40.3 FL (ref 37–54)
EGFRCR SERPLBLD CKD-EPI 2021: 77.4 ML/MIN/1.73
EOSINOPHIL # BLD AUTO: 0 10*3/MM3 (ref 0–0.4)
EOSINOPHIL NFR BLD AUTO: 0 % (ref 0.3–6.2)
ERYTHROCYTE [DISTWIDTH] IN BLOOD BY AUTOMATED COUNT: 13.1 % (ref 12.3–15.4)
FLUAV SUBTYP SPEC NAA+PROBE: NOT DETECTED
FLUBV RNA ISLT QL NAA+PROBE: NOT DETECTED
GLOBULIN UR ELPH-MCNC: 3 GM/DL
GLUCOSE SERPL-MCNC: 130 MG/DL (ref 65–99)
GLUCOSE UR STRIP-MCNC: NEGATIVE MG/DL
HCT VFR BLD AUTO: 40 % (ref 37.5–51)
HGB BLD-MCNC: 13.7 G/DL (ref 13–17.7)
HGB UR QL STRIP.AUTO: ABNORMAL
HOLD SPECIMEN: NORMAL
HYALINE CASTS UR QL AUTO: ABNORMAL /LPF
IMM GRANULOCYTES # BLD AUTO: 0.06 10*3/MM3 (ref 0–0.05)
IMM GRANULOCYTES NFR BLD AUTO: 0.4 % (ref 0–0.5)
KETONES UR QL STRIP: ABNORMAL
LEUKOCYTE ESTERASE UR QL STRIP.AUTO: ABNORMAL
LYMPHOCYTES # BLD AUTO: 0.7 10*3/MM3 (ref 0.7–3.1)
LYMPHOCYTES NFR BLD AUTO: 4.4 % (ref 19.6–45.3)
MCH RBC QN AUTO: 29.5 PG (ref 26.6–33)
MCHC RBC AUTO-ENTMCNC: 34.3 G/DL (ref 31.5–35.7)
MCV RBC AUTO: 86.2 FL (ref 79–97)
MONOCYTES # BLD AUTO: 1.08 10*3/MM3 (ref 0.1–0.9)
MONOCYTES NFR BLD AUTO: 6.8 % (ref 5–12)
NEUTROPHILS NFR BLD AUTO: 13.93 10*3/MM3 (ref 1.7–7)
NEUTROPHILS NFR BLD AUTO: 88.2 % (ref 42.7–76)
NITRITE UR QL STRIP: POSITIVE
NRBC BLD AUTO-RTO: 0 /100 WBC (ref 0–0.2)
PH UR STRIP.AUTO: 5.5 [PH] (ref 5–8)
PLATELET # BLD AUTO: 231 10*3/MM3 (ref 140–450)
PMV BLD AUTO: 8.7 FL (ref 6–12)
POTASSIUM SERPL-SCNC: 3.7 MMOL/L (ref 3.5–5.2)
PROT SERPL-MCNC: 7.3 G/DL (ref 6–8.5)
PROT UR QL STRIP: ABNORMAL
RBC # BLD AUTO: 4.64 10*6/MM3 (ref 4.14–5.8)
RBC # UR STRIP: ABNORMAL /HPF
REF LAB TEST METHOD: ABNORMAL
RSV RNA NPH QL NAA+NON-PROBE: NOT DETECTED
SARS-COV-2 RNA RESP QL NAA+PROBE: NOT DETECTED
SODIUM SERPL-SCNC: 137 MMOL/L (ref 136–145)
SP GR UR STRIP: 1.02 (ref 1–1.03)
SQUAMOUS #/AREA URNS HPF: ABNORMAL /HPF
UROBILINOGEN UR QL STRIP: ABNORMAL
WBC # UR STRIP: ABNORMAL /HPF
WBC NRBC COR # BLD AUTO: 15.8 10*3/MM3 (ref 3.4–10.8)
WHOLE BLOOD HOLD COAG: NORMAL

## 2024-11-21 PROCEDURE — 74177 CT ABD & PELVIS W/CONTRAST: CPT

## 2024-11-21 PROCEDURE — 99223 1ST HOSP IP/OBS HIGH 75: CPT | Performed by: INTERNAL MEDICINE

## 2024-11-21 PROCEDURE — 71045 X-RAY EXAM CHEST 1 VIEW: CPT

## 2024-11-21 PROCEDURE — 25810000003 SODIUM CHLORIDE 0.9 % SOLUTION: Performed by: INTERNAL MEDICINE

## 2024-11-21 PROCEDURE — 25010000002 ENOXAPARIN PER 10 MG: Performed by: INTERNAL MEDICINE

## 2024-11-21 PROCEDURE — 87186 SC STD MICRODIL/AGAR DIL: CPT | Performed by: EMERGENCY MEDICINE

## 2024-11-21 PROCEDURE — 87077 CULTURE AEROBIC IDENTIFY: CPT | Performed by: EMERGENCY MEDICINE

## 2024-11-21 PROCEDURE — 87040 BLOOD CULTURE FOR BACTERIA: CPT | Performed by: EMERGENCY MEDICINE

## 2024-11-21 PROCEDURE — 81001 URINALYSIS AUTO W/SCOPE: CPT | Performed by: EMERGENCY MEDICINE

## 2024-11-21 PROCEDURE — 85025 COMPLETE CBC W/AUTO DIFF WBC: CPT | Performed by: EMERGENCY MEDICINE

## 2024-11-21 PROCEDURE — 99291 CRITICAL CARE FIRST HOUR: CPT

## 2024-11-21 PROCEDURE — 87637 SARSCOV2&INF A&B&RSV AMP PRB: CPT | Performed by: EMERGENCY MEDICINE

## 2024-11-21 PROCEDURE — 83605 ASSAY OF LACTIC ACID: CPT | Performed by: EMERGENCY MEDICINE

## 2024-11-21 PROCEDURE — 36415 COLL VENOUS BLD VENIPUNCTURE: CPT

## 2024-11-21 PROCEDURE — 87086 URINE CULTURE/COLONY COUNT: CPT | Performed by: EMERGENCY MEDICINE

## 2024-11-21 PROCEDURE — 80053 COMPREHEN METABOLIC PANEL: CPT | Performed by: EMERGENCY MEDICINE

## 2024-11-21 PROCEDURE — 25510000001 IOPAMIDOL PER 1 ML: Performed by: EMERGENCY MEDICINE

## 2024-11-21 PROCEDURE — 25010000002 CEFTRIAXONE PER 250 MG: Performed by: EMERGENCY MEDICINE

## 2024-11-21 PROCEDURE — 99222 1ST HOSP IP/OBS MODERATE 55: CPT | Performed by: UROLOGY

## 2024-11-21 RX ORDER — TAMSULOSIN HYDROCHLORIDE 0.4 MG/1
0.4 CAPSULE ORAL NIGHTLY
Status: DISCONTINUED | OUTPATIENT
Start: 2024-11-21 | End: 2024-11-23 | Stop reason: HOSPADM

## 2024-11-21 RX ORDER — ENOXAPARIN SODIUM 100 MG/ML
40 INJECTION SUBCUTANEOUS DAILY
Status: DISCONTINUED | OUTPATIENT
Start: 2024-11-21 | End: 2024-11-23 | Stop reason: HOSPADM

## 2024-11-21 RX ORDER — CALCIUM CARBONATE 500 MG/1
2 TABLET, CHEWABLE ORAL 2 TIMES DAILY PRN
Status: DISCONTINUED | OUTPATIENT
Start: 2024-11-21 | End: 2024-11-23 | Stop reason: HOSPADM

## 2024-11-21 RX ORDER — SODIUM CHLORIDE 0.9 % (FLUSH) 0.9 %
10 SYRINGE (ML) INJECTION EVERY 12 HOURS SCHEDULED
Status: DISCONTINUED | OUTPATIENT
Start: 2024-11-21 | End: 2024-11-23 | Stop reason: HOSPADM

## 2024-11-21 RX ORDER — PHENAZOPYRIDINE HYDROCHLORIDE 100 MG/1
200 TABLET, FILM COATED ORAL 3 TIMES DAILY PRN
Status: CANCELLED | OUTPATIENT
Start: 2024-11-21

## 2024-11-21 RX ORDER — LANOLIN ALCOHOL/MO/W.PET/CERES
25 CREAM (GRAM) TOPICAL DAILY
Status: CANCELLED | OUTPATIENT
Start: 2024-11-21

## 2024-11-21 RX ORDER — OXYBUTYNIN CHLORIDE 5 MG/1
5 TABLET ORAL 3 TIMES DAILY PRN
Status: CANCELLED | OUTPATIENT
Start: 2024-11-21

## 2024-11-21 RX ORDER — HYDROCHLOROTHIAZIDE 12.5 MG/1
12.5 TABLET ORAL
Status: CANCELLED | OUTPATIENT
Start: 2024-11-21

## 2024-11-21 RX ORDER — LISINOPRIL 20 MG/1
20 TABLET ORAL
Status: DISCONTINUED | OUTPATIENT
Start: 2024-11-22 | End: 2024-11-23 | Stop reason: HOSPADM

## 2024-11-21 RX ORDER — MELOXICAM 7.5 MG/1
15 TABLET ORAL DAILY
Status: DISCONTINUED | OUTPATIENT
Start: 2024-11-21 | End: 2024-11-23 | Stop reason: HOSPADM

## 2024-11-21 RX ORDER — SODIUM CHLORIDE 9 MG/ML
100 INJECTION, SOLUTION INTRAVENOUS CONTINUOUS
Status: ACTIVE | OUTPATIENT
Start: 2024-11-21 | End: 2024-11-22

## 2024-11-21 RX ORDER — IOPAMIDOL 755 MG/ML
100 INJECTION, SOLUTION INTRAVASCULAR
Status: COMPLETED | OUTPATIENT
Start: 2024-11-21 | End: 2024-11-21

## 2024-11-21 RX ORDER — BISACODYL 5 MG/1
5 TABLET, DELAYED RELEASE ORAL DAILY PRN
Status: DISCONTINUED | OUTPATIENT
Start: 2024-11-21 | End: 2024-11-23 | Stop reason: HOSPADM

## 2024-11-21 RX ORDER — TAMSULOSIN HYDROCHLORIDE 0.4 MG/1
0.4 CAPSULE ORAL NIGHTLY
Status: CANCELLED | OUTPATIENT
Start: 2024-11-21

## 2024-11-21 RX ORDER — BISACODYL 10 MG
10 SUPPOSITORY, RECTAL RECTAL DAILY PRN
Status: DISCONTINUED | OUTPATIENT
Start: 2024-11-21 | End: 2024-11-23 | Stop reason: HOSPADM

## 2024-11-21 RX ORDER — SODIUM CHLORIDE 9 MG/ML
40 INJECTION, SOLUTION INTRAVENOUS AS NEEDED
Status: DISCONTINUED | OUTPATIENT
Start: 2024-11-21 | End: 2024-11-23 | Stop reason: HOSPADM

## 2024-11-21 RX ORDER — ONDANSETRON 4 MG/1
4 TABLET, ORALLY DISINTEGRATING ORAL EVERY 6 HOURS PRN
Status: DISCONTINUED | OUTPATIENT
Start: 2024-11-21 | End: 2024-11-23 | Stop reason: HOSPADM

## 2024-11-21 RX ORDER — OXYCODONE AND ACETAMINOPHEN 5; 325 MG/1; MG/1
.5-2 TABLET ORAL EVERY 6 HOURS PRN
Status: CANCELLED | OUTPATIENT
Start: 2024-11-21

## 2024-11-21 RX ORDER — AMOXICILLIN 250 MG
2 CAPSULE ORAL 2 TIMES DAILY PRN
Status: DISCONTINUED | OUTPATIENT
Start: 2024-11-21 | End: 2024-11-23 | Stop reason: HOSPADM

## 2024-11-21 RX ORDER — MELOXICAM 7.5 MG/1
15 TABLET ORAL DAILY
Status: CANCELLED | OUTPATIENT
Start: 2024-11-21

## 2024-11-21 RX ORDER — LANOLIN ALCOHOL/MO/W.PET/CERES
100 CREAM (GRAM) TOPICAL DAILY
Status: DISCONTINUED | OUTPATIENT
Start: 2024-11-22 | End: 2024-11-23 | Stop reason: HOSPADM

## 2024-11-21 RX ORDER — UBIDECARENONE 75 MG
100 CAPSULE ORAL DAILY
Status: CANCELLED | OUTPATIENT
Start: 2024-11-21

## 2024-11-21 RX ORDER — OXYCODONE AND ACETAMINOPHEN 5; 325 MG/1; MG/1
1 TABLET ORAL EVERY 8 HOURS PRN
Status: DISCONTINUED | OUTPATIENT
Start: 2024-11-21 | End: 2024-11-23 | Stop reason: HOSPADM

## 2024-11-21 RX ORDER — OXYBUTYNIN CHLORIDE 5 MG/1
5 TABLET ORAL 3 TIMES DAILY PRN
Status: DISCONTINUED | OUTPATIENT
Start: 2024-11-21 | End: 2024-11-23 | Stop reason: HOSPADM

## 2024-11-21 RX ORDER — LISINOPRIL 10 MG/1
10 TABLET ORAL
Status: CANCELLED | OUTPATIENT
Start: 2024-11-21

## 2024-11-21 RX ORDER — SODIUM CHLORIDE 0.9 % (FLUSH) 0.9 %
10 SYRINGE (ML) INJECTION AS NEEDED
Status: DISCONTINUED | OUTPATIENT
Start: 2024-11-21 | End: 2024-11-23 | Stop reason: HOSPADM

## 2024-11-21 RX ORDER — UBIDECARENONE 75 MG
100 CAPSULE ORAL DAILY
Status: DISCONTINUED | OUTPATIENT
Start: 2024-11-22 | End: 2024-11-23 | Stop reason: HOSPADM

## 2024-11-21 RX ORDER — POLYETHYLENE GLYCOL 3350 17 G/17G
17 POWDER, FOR SOLUTION ORAL DAILY PRN
Status: DISCONTINUED | OUTPATIENT
Start: 2024-11-21 | End: 2024-11-23 | Stop reason: HOSPADM

## 2024-11-21 RX ORDER — HYDROCHLOROTHIAZIDE 25 MG/1
25 TABLET ORAL DAILY
Status: DISCONTINUED | OUTPATIENT
Start: 2024-11-22 | End: 2024-11-23 | Stop reason: HOSPADM

## 2024-11-21 RX ADMIN — SODIUM CHLORIDE 100 ML/HR: 9 INJECTION, SOLUTION INTRAVENOUS at 16:05

## 2024-11-21 RX ADMIN — CEFTRIAXONE SODIUM 2000 MG: 2 INJECTION, POWDER, FOR SOLUTION INTRAMUSCULAR; INTRAVENOUS at 09:19

## 2024-11-21 RX ADMIN — Medication 10 ML: at 16:05

## 2024-11-21 RX ADMIN — TAMSULOSIN HYDROCHLORIDE 0.4 MG: 0.4 CAPSULE ORAL at 21:30

## 2024-11-21 RX ADMIN — ENOXAPARIN SODIUM 40 MG: 100 INJECTION SUBCUTANEOUS at 16:05

## 2024-11-21 RX ADMIN — IOPAMIDOL 100 ML: 755 INJECTION, SOLUTION INTRAVENOUS at 09:01

## 2024-11-21 NOTE — Clinical Note
Level of Care: Telemetry [5]   Diagnosis: UTI (urinary tract infection) [674036]   Admitting Physician: PATRICIA CRISTOBAL [[id number]]   Attending Physician: PATRICIA CRISTOBAL [[id number]]

## 2024-11-21 NOTE — H&P
[facility]   HOSPITALIST HISTORY AND PHYSICAL  Date: 2024   Patient Name: Curtis Pereyra  : 1960  MRN: [id number]  Primary Care Physician:  Audelia Villafana APRN  Date of admission: 2024    Subjective   Subjective     Chief Complaint: fever, abd pain    HPI:    Curtis Pereyra is a 64 y.o. male with recent bladder tumor resection who presented with a fever.  Patient went to the operating room last Friday and had Bose placed after his resection.  Patient had his Bose catheter removed today prior and reported constant pressure as if he had to urinate all night.  Patient developed a fever as well as generalized late last night and presented this morning.  Emergency department discussed case with urology.  Urinalysis highly suggestive of infection and ceftriaxone was started.      Personal History     Past Medical History:  Past Medical History:   Diagnosis Date    Anxiety     BPH (benign prostatic hyperplasia)     Erectile dysfunction     Hypertension     Urinary incontinence Oct 24       Past Surgical History:  Past Surgical History:   Procedure Laterality Date    COLONOSCOPY      TRANSURETHRAL RESECTION OF BLADDER TUMOR N/A 11/15/2024    Procedure: CYSTOSCOPY TRANSURETHRAL RESECTION OF BLADDER TUMOR  Plain text: Scope bladder, resect tumor;  Surgeon: Sarah David MD;  Location: [facility] OR;  Service: Urology;  Laterality: N/A;       Family History:   Family History   Problem Relation Age of Onset    Heart disease Father     Malig Hyperthermia Neg Hx        Social History:   Social History     Socioeconomic History    Marital status:    Tobacco Use    Smoking status: Former     Types: Cigarettes    Smokeless tobacco: Never   Vaping Use    Vaping status: Never Used   Substance and Sexual Activity    Alcohol use: Never    Drug use: Never    Sexual activity: Defer       Home Medications:  lisinopril-hydrochlorothiazide, meloxicam, naloxone, oxyCODONE-acetaminophen,  oxybutynin, phenazopyridine, pyridoxine, solifenacin, tamsulosin, venlafaxine XR, and vitamin B-12    Allergies:  No Known Allergies    Objective   Objective     Vitals:   Temp:  [98.5 °F (36.9 °C)] 98.5 °F (36.9 °C)  Heart Rate:  [100-123] 106  Resp:  [16] 16  BP: (119-133)/(68-81) 123/68    Physical Exam    Constitutional: Awake, alert, no acute distress   Eyes: Pupils equal, sclerae anicteric, no conjunctival injection   HENT: NCAT, mucous membranes moist   Neck: Supple, no thyromegaly, no lymphadenopathy, trachea midline   Respiratory: Clear to auscultation bilaterally, nonlabored respirations    Cardiovascular: RRR, no murmurs, rubs, or gallops, palpable pedal pulses bilaterally   Gastrointestinal: Positive bowel sounds, soft, nontender, nondistended   Musculoskeletal: No bilateral ankle edema, no clubbing or cyanosis to extremities   Psychiatric: Appropriate affect, cooperative   Neurologic: Oriented x 3, strength symmetric in all extremities, Cranial Nerves grossly intact to confrontation, speech clear   Skin: No rashes     Result Review    Result Review:  I have personally reviewed the results from the time of this admission to 11/21/2024 10:37 EST and agree with these findings:  [x]  Laboratory  [x]  Microbiology  [x]  Radiology  []  EKG/Telemetry   []  Cardiology/Vascular   []  Pathology  []  Old records  []  Other:      Assessment & Plan   Assessment / Plan     Assessment/Plan:     Sepsis/UTI  Patient had bladder resection 6 days ago.  Patient went home with Bose.  Patient had Bose removed yesterday.  Urinary complaints have started for the last 12 hours or so.  Urinalysis suggestive of infection  Got a dose of ceftriaxone  We will continue, await cultures.  Urology was made aware of the patient by the emergency room.  They will see him later today.            CODE STATUS:         Admission Status:  I believe this patient meets obs status.    Electronically signed by Jacek Gannon DO, 11/21/24, 10:37  AM EST.

## 2024-11-21 NOTE — ED PROVIDER NOTES
Time: 7:47 AM EST  Date of encounter:  11/21/2024  Independent Historian/Clinical History and Information was obtained by:   Patient    History is limited by: N/A    Chief Complaint: Fever      History of Present Illness:  Patient is a 64 y.o. year old male who presents to the emergency department for evaluation of fever.  Reports a fever of 102.7 last night.  States that he just had his Bose catheter removed yesterday after having a bladder tumor resection.  Reports that he has felt like he is had a pee all night as well as had pressure in his lower abdomen.  He does report chills last night as well as a fever.  Denies any cough or congestion.  No other complaints this time.      Patient Care Team  Primary Care Provider: Audelia Villafana APRN    Past Medical History:     No Known Allergies  Past Medical History:   Diagnosis Date    Anxiety     BPH (benign prostatic hyperplasia)     Erectile dysfunction     Hypertension     Urinary incontinence Oct 24     Past Surgical History:   Procedure Laterality Date    COLONOSCOPY      TRANSURETHRAL RESECTION OF BLADDER TUMOR N/A 11/15/2024    Procedure: CYSTOSCOPY TRANSURETHRAL RESECTION OF BLADDER TUMOR  Plain text: Scope bladder, resect tumor;  Surgeon: Sarah David MD;  Location: [facility] MAIN OR;  Service: Urology;  Laterality: N/A;     Family History   Problem Relation Age of Onset    Heart disease Father     Malig Hyperthermia Neg Hx        Home Medications:  Prior to Admission medications    Medication Sig Start Date End Date Taking? Authorizing Provider   lisinopril-hydrochlorothiazide (PRINZIDE,ZESTORETIC) 20-25 MG per tablet Take 1 tablet by mouth Daily.    Provider, MD Bryce   meloxicam (MOBIC) 15 MG tablet Take 1 tablet by mouth Daily.    Provider, MD Bryce   naloxone (NARCAN) 4 MG/0.1ML nasal spray Call 911. Don't prime. [place] in 1 nostril for overdose. Repeat in 2-3 minutes in other nostril if no or minimal breathing/responsiveness. 11/15/24    "Sarah David MD   oxybutynin (DITROPAN) 5 MG tablet Take 1 tablet by mouth 3 (Three) Times a Day As Needed (Bladder spasms, bladder cramping, leakage around catheter, urinary urgency). May cause constipation 11/15/24   Sarah David MD   oxyCODONE-acetaminophen (Percocet) 5-325 MG per tablet May take 0.5-2 tablets every 4-8 hours as needed for severe pain 11/15/24   Sarah David MD   phenazopyridine (Pyridium) 200 MG tablet Take 1 tablet by mouth 3 (Three) Times a Day As Needed for Bladder Spasms (Burning, urinary discomfort). Will turn urine orange 11/15/24   Sarah David MD   pyridoxine (VITAMIN B-6) 25 MG tablet Take 1 tablet by mouth Daily.    ProviderBryce MD   solifenacin (VESICARE) 5 MG tablet Take 1 tablet by mouth Daily. 11/20/24   Sarah David MD   tamsulosin (FLOMAX) 0.4 MG capsule 24 hr capsule Take 1 capsule by mouth Every Night.    Bryce Kimble MD   venlafaxine XR (EFFEXOR-XR) 75 MG 24 hr capsule Take 3 capsules by mouth Daily. 7/17/24   Bryce Kimble MD   vitamin B-12 (cyanocobalamin) 100 MCG tablet Take 1 tablet by mouth Daily.    ProviderBryce MD        Social History:   Social History     Tobacco Use    Smoking status: Former     Types: Cigarettes    Smokeless tobacco: Never   Vaping Use    Vaping status: Never Used   Substance Use Topics    Alcohol use: Never    Drug use: Never         Review of Systems:  Review of Systems   Constitutional:  Positive for fever.        Physical Exam:  /70   Pulse 108   Temp 98.5 °F (36.9 °C) (Oral)   Resp 16   Ht 195.6 cm (77\")   Wt (!) 146 kg (320 lb 15.8 oz)   SpO2 97%   BMI 38.06 kg/m²     Physical Exam  Vitals and nursing note reviewed.   Constitutional:       Appearance: Normal appearance.   HENT:      Head: Normocephalic and atraumatic.   Eyes:      General: No scleral icterus.  Cardiovascular:      Rate and Rhythm: Regular rhythm. Tachycardia present.      Heart sounds: Normal heart " sounds.   Pulmonary:      Effort: Pulmonary effort is normal.      Breath sounds: Normal breath sounds.   Abdominal:      Palpations: Abdomen is soft.      Tenderness: There is no abdominal tenderness.   Genitourinary:     Penis: Normal.       Testes: Normal.   Musculoskeletal:         General: Normal range of motion.      Cervical back: Normal range of motion.   Skin:     Findings: No rash.   Neurological:      General: No focal deficit present.      Mental Status: He is alert.                  Procedures:  Procedures      Medical Decision Making:      Comorbidities that affect care:    Bladder mass, obesity, hypertension    External Notes reviewed:    Reviewed office visit from yesterday      The following orders were placed and all results were independently analyzed by me:  Orders Placed This Encounter   Procedures    Blood Culture - Blood,    Blood Culture - Blood,    COVID-19, FLU A/B, RSV PCR 1 HR TAT - Swab, Nasopharynx    Urine Culture - Urine,    XR Chest 1 View    CT Abdomen Pelvis With Contrast    Comprehensive Metabolic Panel    Urinalysis With Culture If Indicated - Urine, Clean Catch    Lactic Acid, Plasma    CBC Auto Differential    Urinalysis, Microscopic Only - Urine, Clean Catch    IP Consult to Urology    Inpatient Hospitalist Consult    CBC & Differential    Extra Tubes    Gold Top - SST    Light Blue Top       Medications Given in the Emergency Department:  Medications   iopamidol (ISOVUE-370) 76 % injection 100 mL (100 mL Intravenous Given 11/21/24 0901)   cefTRIAXone (ROCEPHIN) in NS 2 GRAMS/20ml IV PUSH syringe (2,000 mg Intravenous Given 11/21/24 0919)        ED Course:    ED Course as of 11/21/24 0947   Thu Nov 21, 2024   0923 Spoke with Dr. Larsen who recommends IV antibiotics, admission for further workup and management. [MA]   0947 Spoke with Dr. Gannon who agrees to admit [MA]      ED Course User Index  [MA] Froylan Vázquez MD       Labs:    Lab Results (last 24 hours)        Procedure Component Value Units Date/Time    CBC & Differential [[id number]]  (Abnormal) Collected: 11/21/24 0800    Specimen: Blood from Arm, Right Updated: 11/21/24 0814    Narrative:      The following orders were created for panel order CBC & Differential.  Procedure                               Abnormality         Status                     ---------                               -----------         ------                     CBC Auto Differential[[id number]]        Abnormal            Final result                 Please view results for these tests on the individual orders.    Comprehensive Metabolic Panel [[id number]]  (Abnormal) Collected: 11/21/24 0800    Specimen: Blood from Arm, Right Updated: 11/21/24 0852     Glucose 130 mg/dL      BUN 21 mg/dL      Creatinine 1.06 mg/dL      Sodium 137 mmol/L      Potassium 3.7 mmol/L      Chloride 100 mmol/L      CO2 24.3 mmol/L      Calcium 9.8 mg/dL      Total Protein 7.3 g/dL      Albumin 4.3 g/dL      ALT (SGPT) 21 U/L      AST (SGOT) 18 U/L      Alkaline Phosphatase 89 U/L      Total Bilirubin 0.7 mg/dL      Globulin 3.0 gm/dL      A/G Ratio 1.4 g/dL      BUN/Creatinine Ratio 19.8     Anion Gap 12.7 mmol/L      eGFR 77.4 mL/min/1.73     Narrative:      GFR Normal >60  Chronic Kidney Disease <60  Kidney Failure <15      Blood Culture - Blood, Arm, Right [[id number]] Collected: 11/21/24 0800    Specimen: Blood from Arm, Right Updated: 11/21/24 0809    Lactic Acid, Plasma [[id number]]  (Normal) Collected: 11/21/24 0800    Specimen: Blood from Arm, Right Updated: 11/21/24 0850     Lactate 2.0 mmol/L     CBC Auto Differential [[id number]]  (Abnormal) Collected: 11/21/24 0800    Specimen: Blood from Arm, Right Updated: 11/21/24 0814     WBC 15.80 10*3/mm3      RBC 4.64 10*6/mm3      Hemoglobin 13.7 g/dL      Hematocrit 40.0 %      MCV 86.2 fL      MCH 29.5 pg      MCHC 34.3 g/dL      RDW 13.1 %      RDW-SD 40.3 fl      MPV 8.7 fL      Platelets 231 10*3/mm3      Neutrophil  % 88.2 %      Lymphocyte % 4.4 %      Monocyte % 6.8 %      Eosinophil % 0.0 %      Basophil % 0.2 %      Immature Grans % 0.4 %      Neutrophils, Absolute 13.93 10*3/mm3      Lymphocytes, Absolute 0.70 10*3/mm3      Monocytes, Absolute 1.08 10*3/mm3      Eosinophils, Absolute 0.00 10*3/mm3      Basophils, Absolute 0.03 10*3/mm3      Immature Grans, Absolute 0.06 10*3/mm3      nRBC 0.0 /100 WBC     COVID-19, FLU A/B, RSV PCR 1 HR TAT - Swab, Nasopharynx [[id number]]  (Normal) Collected: 11/21/24 0809    Specimen: Swab from Nasopharynx Updated: 11/21/24 0900     COVID19 Not Detected     Influenza A PCR Not Detected     Influenza B PCR Not Detected     RSV, PCR Not Detected    Narrative:      Fact sheet for providers: https://www.fda.gov/media/796244/download    Fact sheet for patients: https://www.fda.gov/media/446724/download    Test performed by PCR.    Blood Culture - Blood, Arm, Left [[id number]] Collected: 11/21/24 0811    Specimen: Blood from Arm, Left Updated: 11/21/24 0818    Urinalysis With Culture If Indicated - Urine, Clean Catch [[id number]]  (Abnormal) Collected: 11/21/24 0821    Specimen: Urine, Clean Catch Updated: 11/21/24 0841     Color, UA Yellow     Appearance, UA Cloudy     pH, UA 5.5     Specific Gravity, UA 1.024     Glucose, UA Negative     Ketones, UA Trace     Bilirubin, UA Negative     Blood, UA Large (3+)     Protein,  mg/dL (2+)     Leuk Esterase, UA Large (3+)     Nitrite, UA Positive     Urobilinogen, UA 1.0 E.U./dL    Narrative:      In absence of clinical symptoms, the presence of pyuria, bacteria, and/or nitrites on the urinalysis result does not correlate with infection.    Urinalysis, Microscopic Only - Urine, Clean Catch [[id number]]  (Abnormal) Collected: 11/21/24 0821    Specimen: Urine, Clean Catch Updated: 11/21/24 0842     RBC, UA 21-50 /HPF      WBC, UA Too Numerous to Count /HPF      Bacteria, UA None Seen /HPF      Squamous Epithelial Cells, UA 0-2 /HPF      Hyaline  Casts, UA 0-2 /LPF      Methodology Automated Microscopy    Urine Culture - Urine, Urine, Clean Catch [[id number]] Collected: 11/21/24 0821    Specimen: Urine, Clean Catch Updated: 11/21/24 0842             Imaging:    CT Abdomen Pelvis With Contrast    Result Date: 11/21/2024  CT ABDOMEN PELVIS W CONTRAST Date of Exam: 11/21/2024 9:00 AM EST Indication: fever, pelvic pressure, post op. Comparison: None available. Technique: Axial CT images were obtained of the abdomen and pelvis after the uneventful intravenous administration of iodinated contrast. Reconstructed coronal and sagittal images were also obtained. Automated exposure control and iterative construction methods were used. Findings: Liver: The liver is unremarkable in morphology and decreased in attenuation. There are multiple liver cysts and subcentimeter hypodensities which are too small to characterize. No biliary dilation is seen. Gallbladder: Unremarkable. Pancreas: Unremarkable. Spleen: Multiple splenic granulomas. Adrenal glands: Unremarkable. Genitourinary tract: Small bilateral renal cysts noted. Kidneys are otherwise unremarkable. No hydronephrosis is seen. The visualized portions of the ureters are unremarkable. Bladder wall thickening/indistinctness, concerning for cystitis. Prostate gland is mildly prominent. Gastrointestinal tract: Colonic diverticulosis is present. There is mild inflammatory stranding about an inflamed diverticulum of the proximal sigmoid colon (series 3, image 75), compatible with acute diverticulitis. Hollow viscera appear otherwise unremarkable. There is no evidence of bowel obstruction. Appendix: No findings to suggest acute appendicitis. Other findings: No free air or free fluid is identified. No pathologically enlarged lymph nodes are seen. Vascular calcifications are present. The IVC is unremarkable. Bones and soft tissues: No acute or suspicious osseous or soft tissue lesion is identified. Lung bases: The visualized  lung bases are clear.     Impression: 1.Bladder wall thickening/indistinctness, concerning for cystitis. Please correlate with urinalysis. 2.Findings concerning for mild acute diverticulitis involving the proximal sigmoid colon. No free air or abscess formation is identified. Please correlate with symptomatology. 3.Additional findings as detailed above. Electronically Signed: Benjamin Burgess MD  11/21/2024 9:11 AM EST  Workstation ID: [id number]    XR Chest 1 View    Result Date: 11/21/2024  XR CHEST 1 VW Date of Exam: 11/21/2024 8:04 AM EST Indication: fever Comparison: 2/7/2020 and prior Findings: Study is limited by overlying support and monitoring apparatus. The heart and mediastinal contours are within normal limits. The lungs appear to be grossly clear. There is evidence of old granulomatous disease. Osseous structures demonstrate no acute abnormality     Impression: No acute process Electronically Signed: Jun Preston MD  11/21/2024 8:25 AM EST  Workstation ID: [id number]       Differential Diagnosis and Discussion:    Abdominal Pain: Based on the patient's signs and symptoms, I considered abdominal aortic aneurysm, small bowel obstruction, pancreatitis, acute cholecystitis, acute appendecitis, peptic ulcer disease, gastritis, colitis, endocrine disorders, irritable bowel syndrome and other differential diagnosis an etiology of the patient's abdominal pain.  Fever: Based on the complaint of fever, differential diagnosis includes but is not limited to meningitis, pneumonia, pyelonephritis, acute uti,  systemic immune response syndrome, sepsis, viral syndrome, fungal infection, tick born illness and other bacterial infections.    All labs were reviewed and interpreted by me.  All X-rays impressions were independently interpreted by me.  CT scan radiology impression was interpreted by me.    MDM     Patient is a 66-year-old gentleman who presents with chills and a fever last night.  Just had his Bose catheter  removed yesterday after having a bladder tumor removal.  Dr. Larsen had called ahead and reported that the patient had a fever about 102 last night and stated that he needed labs checked as well as possible CT.  CT shows cystitis and may be some diverticulitis but appears to be more UTI related.  Has been tachycardic here the entire time in the 110s.  Has an elevated white count as well.  I did speak with Dr. David after the CT scan and she recommends antibiotics and watching in the hospital.  Will admit for further workup and management.            Sepsis criteria was met in the emergency department and the Sepsis protocol (including antibiotic administration) was initiated.      SIRS criteria considered:   1.  Temperature > 100.4 or <96.8    2.  Heart Rate > 90    3.  Respiratory Rate > 22    4.  WBC > 12K or <4K.             Severe Sepsis:     Respiratory: Mechanical Ventilation or Bipap  Hypotension: SBP > 90 or MAP < 65  Renal: Creatinine > 2  Metabolic: Lactic Acid > 2  Hematologic: Platelets < 100K or INR > 1.5  Hepatic: BILI  >  2  CNS: Sudden AMS     Septic Shock:     Severe Sepsis + Persistent hypotension or Lactic Acid > 4     Normal saline bolus, Antibiotics, and final disposition was based on these definitions.        Sepsis was recognized at 0910    Antibiotics were ordered.     30 mL/kg bolus was not indicated.         The patient presents with 2 out of the 4 SIRS criteria and a suspected source for sepsis.  Patient was evaluated and placed on a cardiac monitor for fear of worsening tachycardia and life-threatening hypotension.  Patient was monitored for shock and signs of end-organ damage.  Mental status was repeatedly checked throughout the ED stay.  Medications were ordered by me which includes abx. .  The case was discussed at length with admitting physician.    Total Critical Care time of 33 minutes. Total critical care time documented does not include time spent on separately billed procedures  for services of nurses or physician assistants. I personally saw and examined the patient. I have reviewed all diagnostic interpretations and treatment plans as written. I was present for the key portions of any procedures performed and the inclusive time noted in any critical care statement. Critical care time includes patient management by me, time spent at the patients bedside,  time to review lab and imaging results, discussing patient care, documentation in the medical record, and time spent with family or caregiver.    Patient Care Considerations:          Consultants/Shared Management Plan:    Hospitalist: I have discussed the case with Dr. Gannon who agrees to accept the patient for admission.  Consultant: I have discussed the case with Dr. Larsen who states antibiotics and admission    Social Determinants of Health:    Patient is independent, reliable, and has access to care.       Disposition and Care Coordination:    Admit:   Through independent evaluation of the patient's history, physical, and imperical data, the patient meets criteria for inpatient admission to the hospital.        Final diagnoses:   Urinary tract infection without hematuria, site unspecified   Sepsis, due to unspecified organism, unspecified whether acute organ dysfunction present        ED Disposition       ED Disposition   Decision to Admit    Condition   --    Comment   --               This medical record created using voice recognition software.             Froylan Vázquez MD  11/21/24 0998

## 2024-11-21 NOTE — CASE MANAGEMENT/SOCIAL WORK
Discharge Planning Assessment   [place]     Patient Name: Curtis Pereyra  MRN: [id number]  Today's Date: 11/21/2024    Admit Date: 11/21/2024        Discharge Needs Assessment       Row Name 11/21/24 1106       Living Environment    People in Home spouse (P)     Current Living Arrangements home (P)     Potentially Unsafe Housing Conditions none (P)     In the past 12 months has the electric, gas, oil, or water company threatened to shut off services in your home? No (P)     Primary Care Provided by self (P)     Provides Primary Care For no one (P)     Family Caregiver if Needed none (P)     Quality of Family Relationships helpful;involved;supportive (P)     Able to Return to Prior Arrangements yes (P)        Resource/Environmental Concerns    Resource/Environmental Concerns none (P)     Transportation Concerns none (P)        Transportation Needs    In the past 12 months, has lack of transportation kept you from medical appointments or from getting medications? no (P)     In the past 12 months, has lack of transportation kept you from meetings, work, or from getting things needed for daily living? No (P)        Food Insecurity    Within the past 12 months, you worried that your food would run out before you got the money to buy more. Never true (P)     Within the past 12 months, the food you bought just didn't last and you didn't have money to get more. Never true (P)        Transition Planning    Patient/Family Anticipates Transition to home (P)     Patient/Family Anticipated Services at Transition none (P)     Transportation Anticipated family or friend will provide (P)        Discharge Needs Assessment    Readmission Within the Last 30 Days no previous admission in last 30 days (P)     Equipment Currently Used at Home none (P)     Concerns to be Addressed no discharge needs identified (P)     Do you want help finding or keeping work or a job? I do not need or want help (P)     Do you want help with school or  training? For example, starting or completing job training or getting a high school diploma, GED or equivalent No (P)     Anticipated Changes Related to Illness none (P)     Equipment Needed After Discharge none (P)                    Discharge Plan    No documentation.                 Continued Care and Services - Admitted Since 11/21/2024    No active coordination exists for this encounter.          Demographic Summary       Row Name 11/21/24 1103       General Information    Preferred Language English (P)                    Functional Status       Row Name 11/21/24 1104       Physical Activity    On average, how many days per week do you engage in moderate to strenuous exercise (like a brisk walk)? 0 days (P)     On average, how many minutes do you engage in exercise at this level? 0 min (P)     Number of minutes of exercise per week 0 (P)        Functional Status, IADL    Medications independent (P)     Meal Preparation independent (P)     Housekeeping independent (P)     Laundry independent (P)     Shopping independent (P)     If for any reason you need help with day-to-day activities such as bathing, preparing meals, shopping, managing finances, etc., do you get the help you need? I don't need any help (P)                    Psychosocial       Row Name 11/21/24 1105       Mental Health    Little interest or pleasure in doing things Not at all (P)     Feeling down, depressed, or hopeless Several days (P)        Stress    Do you feel stress - tense, restless, nervous, or anxious, or unable to sleep at night because your mind is troubled all the time - these days? Only a littl (P)        Developmental Stage (Eriksson's Stages of Development)    Developmental Stage Stage 8 (65 years-death/Late Adulthood) Integrity vs. Despair (P)                    Abuse/Neglect       Row Name 11/21/24 1105       Personal Safety    Feels Unsafe at Home or Work/School no (P)     Feels Threatened by Someone no (P)     Does Anyone  Try to Keep You From Having Contact with Others or Doing Things Outside Your Home? no (P)     Physical Signs of Abuse Present no (P)                    Legal       Row Name 11/21/24 7064       Financial Resource Strain    How hard is it for you to pay for the very basics like food, housing, medical care, and heating? Not hard (P)                    Substance Abuse    No documentation.                  Patient Forms    No documentation.                 Sw met with pt and pt wife at bedside. 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 Pt has a son that lives next door and will come over if there was anything pt was needing. Pt is looking forward to the holidays and spending it with family and friends. Pt did not state any current needs at this time.    Rivka Coker, Social Work Student

## 2024-11-22 PROBLEM — N32.89 BLADDER MASS: Status: RESOLVED | Noted: 2024-11-13 | Resolved: 2024-11-22

## 2024-11-22 PROBLEM — R31.0 GROSS HEMATURIA: Status: RESOLVED | Noted: 2024-11-13 | Resolved: 2024-11-22

## 2024-11-22 PROBLEM — R39.15 URINARY URGENCY: Status: ACTIVE | Noted: 2024-11-22

## 2024-11-22 LAB
ANION GAP SERPL CALCULATED.3IONS-SCNC: 11 MMOL/L (ref 5–15)
BASOPHILS # BLD AUTO: 0.03 10*3/MM3 (ref 0–0.2)
BASOPHILS NFR BLD AUTO: 0.2 % (ref 0–1.5)
BUN SERPL-MCNC: 20 MG/DL (ref 8–23)
BUN/CREAT SERPL: 21.7 (ref 7–25)
CALCIUM SPEC-SCNC: 8.6 MG/DL (ref 8.6–10.5)
CHLORIDE SERPL-SCNC: 103 MMOL/L (ref 98–107)
CO2 SERPL-SCNC: 24 MMOL/L (ref 22–29)
CREAT SERPL-MCNC: 0.92 MG/DL (ref 0.76–1.27)
DEPRECATED RDW RBC AUTO: 42.7 FL (ref 37–54)
EGFRCR SERPLBLD CKD-EPI 2021: 91.7 ML/MIN/1.73
EOSINOPHIL # BLD AUTO: 0.01 10*3/MM3 (ref 0–0.4)
EOSINOPHIL NFR BLD AUTO: 0.1 % (ref 0.3–6.2)
ERYTHROCYTE [DISTWIDTH] IN BLOOD BY AUTOMATED COUNT: 13.1 % (ref 12.3–15.4)
GLUCOSE SERPL-MCNC: 138 MG/DL (ref 65–99)
HCT VFR BLD AUTO: 34.4 % (ref 37.5–51)
HGB BLD-MCNC: 11.7 G/DL (ref 13–17.7)
IMM GRANULOCYTES # BLD AUTO: 0.05 10*3/MM3 (ref 0–0.05)
IMM GRANULOCYTES NFR BLD AUTO: 0.4 % (ref 0–0.5)
LYMPHOCYTES # BLD AUTO: 0.92 10*3/MM3 (ref 0.7–3.1)
LYMPHOCYTES NFR BLD AUTO: 7.2 % (ref 19.6–45.3)
MCH RBC QN AUTO: 30.1 PG (ref 26.6–33)
MCHC RBC AUTO-ENTMCNC: 34 G/DL (ref 31.5–35.7)
MCV RBC AUTO: 88.4 FL (ref 79–97)
MONOCYTES # BLD AUTO: 1.14 10*3/MM3 (ref 0.1–0.9)
MONOCYTES NFR BLD AUTO: 8.9 % (ref 5–12)
NEUTROPHILS NFR BLD AUTO: 10.68 10*3/MM3 (ref 1.7–7)
NEUTROPHILS NFR BLD AUTO: 83.2 % (ref 42.7–76)
NRBC BLD AUTO-RTO: 0 /100 WBC (ref 0–0.2)
PLATELET # BLD AUTO: 168 10*3/MM3 (ref 140–450)
PMV BLD AUTO: 9 FL (ref 6–12)
POTASSIUM SERPL-SCNC: 3.3 MMOL/L (ref 3.5–5.2)
RBC # BLD AUTO: 3.89 10*6/MM3 (ref 4.14–5.8)
SODIUM SERPL-SCNC: 138 MMOL/L (ref 136–145)
WBC NRBC COR # BLD AUTO: 12.83 10*3/MM3 (ref 3.4–10.8)

## 2024-11-22 PROCEDURE — 36415 COLL VENOUS BLD VENIPUNCTURE: CPT | Performed by: INTERNAL MEDICINE

## 2024-11-22 PROCEDURE — 25010000002 ENOXAPARIN PER 10 MG: Performed by: INTERNAL MEDICINE

## 2024-11-22 PROCEDURE — 25010000002 CEFTRIAXONE PER 250 MG: Performed by: INTERNAL MEDICINE

## 2024-11-22 PROCEDURE — 99231 SBSQ HOSP IP/OBS SF/LOW 25: CPT | Performed by: UROLOGY

## 2024-11-22 PROCEDURE — 80048 BASIC METABOLIC PNL TOTAL CA: CPT | Performed by: INTERNAL MEDICINE

## 2024-11-22 PROCEDURE — 85025 COMPLETE CBC W/AUTO DIFF WBC: CPT | Performed by: INTERNAL MEDICINE

## 2024-11-22 PROCEDURE — 99232 SBSQ HOSP IP/OBS MODERATE 35: CPT | Performed by: FAMILY MEDICINE

## 2024-11-22 PROCEDURE — 25810000003 SODIUM CHLORIDE 0.9 % SOLUTION: Performed by: INTERNAL MEDICINE

## 2024-11-22 RX ORDER — POTASSIUM CHLORIDE 750 MG/1
40 CAPSULE, EXTENDED RELEASE ORAL ONCE
Status: COMPLETED | OUTPATIENT
Start: 2024-11-22 | End: 2024-11-22

## 2024-11-22 RX ORDER — OXYBUTYNIN CHLORIDE 5 MG/1
5 TABLET, EXTENDED RELEASE ORAL DAILY
Status: DISCONTINUED | OUTPATIENT
Start: 2024-11-22 | End: 2024-11-23 | Stop reason: HOSPADM

## 2024-11-22 RX ORDER — ACETAMINOPHEN 325 MG/1
650 TABLET ORAL EVERY 4 HOURS PRN
Status: DISCONTINUED | OUTPATIENT
Start: 2024-11-22 | End: 2024-11-23 | Stop reason: HOSPADM

## 2024-11-22 RX ADMIN — CEFTRIAXONE SODIUM 2000 MG: 2 INJECTION, POWDER, FOR SOLUTION INTRAMUSCULAR; INTRAVENOUS at 08:40

## 2024-11-22 RX ADMIN — SODIUM CHLORIDE 100 ML/HR: 9 INJECTION, SOLUTION INTRAVENOUS at 12:05

## 2024-11-22 RX ADMIN — ACETAMINOPHEN 650 MG: 325 TABLET ORAL at 03:04

## 2024-11-22 RX ADMIN — OXYBUTYNIN CHLORIDE 5 MG: 5 TABLET, EXTENDED RELEASE ORAL at 10:22

## 2024-11-22 RX ADMIN — Medication 100 MCG: at 08:40

## 2024-11-22 RX ADMIN — PYRIDOXINE HCL TAB 50 MG 100 MG: 50 TAB at 08:40

## 2024-11-22 RX ADMIN — VENLAFAXINE HYDROCHLORIDE 225 MG: 150 CAPSULE, EXTENDED RELEASE ORAL at 08:40

## 2024-11-22 RX ADMIN — POTASSIUM CHLORIDE 40 MEQ: 750 CAPSULE, EXTENDED RELEASE ORAL at 16:56

## 2024-11-22 RX ADMIN — LISINOPRIL 20 MG: 20 TABLET ORAL at 08:40

## 2024-11-22 RX ADMIN — MELOXICAM 15 MG: 7.5 TABLET ORAL at 08:40

## 2024-11-22 RX ADMIN — ENOXAPARIN SODIUM 40 MG: 100 INJECTION SUBCUTANEOUS at 08:40

## 2024-11-22 RX ADMIN — HYDROCHLOROTHIAZIDE 25 MG: 25 TABLET ORAL at 08:40

## 2024-11-22 RX ADMIN — Medication 10 ML: at 20:12

## 2024-11-22 RX ADMIN — TAMSULOSIN HYDROCHLORIDE 0.4 MG: 0.4 CAPSULE ORAL at 20:11

## 2024-11-22 RX ADMIN — ACETAMINOPHEN 650 MG: 325 TABLET ORAL at 20:11

## 2024-11-22 NOTE — PROGRESS NOTES
[facility]   UROLOGY Consult Note    Patient Name: Curtis Pereyra  : 1960  MRN: [id number]  Primary Care Physician:  Audelia Villafana APRN  Referring Physician: No ref. provider found  Date of admission: 2024    Subjective   Subjective     Reason for Consult/ Chief Complaint: UTI    HPI:  Curtis Pereyra is a 64 y.o. male history of bladder cancer, high-grade T1, who is status post TURBT 11/15/2024.  Called answering service early this morning with reported fever to 102; went down to 101 with ibuprofen.  Patient also notes severe chills, urinary incontinence.  No significant bladder pain or left-sided pain.  Does note severe fecal urgency.  Was instructed to go to ER.    Workup in ER reveals leukocytosis 15.80, creatinine 1.06; UA concerning for infection with TNTC WBCs, 21-50 RBCs, no bacteria; 3+ leukocyte esterase, positive nitrate.  Blood cultures and urine cultures pending.  Started on empiric antibiotic.    CT abdomen pelvis with contrast reveals bladder wall thickening concerning for cystitis as well as concerning for mild acute diverticulitis.  No hydronephrosis or perivesical fluid collection.    Patient admitted to hospitalist service for medical management; urology consulted.    Patient states he feels somewhat better since admission; has not had chills again.  Does note persistent fecal urgency and urinary urgency as well.  No significant pain.  Denies hematuria.    Review of Systems   All systems were reviewed and negative except for: ER H&P and the above    Personal History     Past Medical History:   Diagnosis Date    Anxiety     BPH (benign prostatic hyperplasia)     Erectile dysfunction     Hypertension     Urinary incontinence Oct 24       Past Surgical History:   Procedure Laterality Date    COLONOSCOPY      TRANSURETHRAL RESECTION OF BLADDER TUMOR N/A 11/15/2024    Procedure: CYSTOSCOPY TRANSURETHRAL RESECTION OF BLADDER TUMOR  Plain text: Scope bladder, resect tumor;   Surgeon: Sarah David MD;  Location: [facility] MAIN OR;  Service: Urology;  Laterality: N/A;       Family History: family history includes Heart disease in his father. Otherwise pertinent FHx was reviewed and not pertinent to current issue.    Social History:  reports that he has quit smoking. His smoking use included cigarettes. He has never used smokeless tobacco. He reports that he does not drink alcohol and does not use drugs.    Home Medications:  lisinopril-hydrochlorothiazide, meloxicam, oxyCODONE-acetaminophen, oxybutynin, phenazopyridine, pyridoxine, solifenacin, tamsulosin, venlafaxine XR, and vitamin B-12    Allergies:  No Known Allergies    Objective    Objective     Vitals:   Temp:  [99 °F (37.2 °C)-100.6 °F (38.1 °C)] 99 °F (37.2 °C)  Heart Rate:  [] 94  Resp:  [14-18] 18  BP: (116-162)/(48-73) 119/67    Physical Exam:   No acute distress, well-nourished  Awake alert and oriented  Mood normal; affect normal    Result Review    Result Review:  I have personally reviewed the results from the time of this admission to 2024 07:40 EST and agree with these findings:  [x]  Laboratory  [x]  Microbiology  []  Radiology  []  EKG/Telemetry   []  Cardiology/Vascular   []  Pathology  []  Old records  []  Other:      Assessment & Plan   Assessment / Plan     Brief Patient Summary:  Curtis Pereyra is a 64 y.o. male recent diagnosis of bladder cancer, high-grade T1 status post TURBT admitted with UTI, possible diverticulitis on CT    Active Hospital Problems:  Active Hospital Problems    Diagnosis     **UTI (urinary tract infection)     Urinary urgency     Malignant neoplasm of overlapping sites of bladder        Plan:   Labs and CT scan imaging reviewed    No acute urologic management  at this time; no Bose catheter warranted, patient emptying bladder; decompressed bladder on CT; no hydronephrosis.    Will order oxybutynin as needed for urinary urgency; also having fecal urgency; discussed  that this is not typical after TURBT; uncertain if this is a symptom of possible diverticulitis.    Cultures pending; continue empiric management  Appreciate hospitalist service admitting for medical management    Urology to follow    Electronically signed by Sarah David MD, 11/21/24, 17:15 PM EST.

## 2024-11-22 NOTE — PROGRESS NOTES
[facility]   Urology Progress Note    Patient Name: Curtis Pereyra  : 1960  MRN: [id number]  Primary Care Physician:  Audelia Villafana APRN  Date of admission: 2024    Subjective   Subjective     Feeling somewhat better; no chills overnight; urinary urgency has decreased.  Resolution of his incontinence.      Objective   Objective     Vitals:   Temp:  [98.6 °F (37 °C)-100.6 °F (38.1 °C)] 99.9 °F (37.7 °C)  Heart Rate:  [] 88  Resp:  [16-18] 18  BP: (116-162)/(48-72) 133/63  Physical Exam     Alert and oriented x3  No acute distress  Unlabored respirations        Result Review    Result Review:  I have personally reviewed the results from the time of this admission to 2024 12:51 EST and agree with these findings:  [x]  Laboratory  [x]  Microbiology  []  Radiology  []  EKG/Telemetry   []  Cardiology/Vascular   []  Pathology  []  Old records  []  Other:      Assessment & Plan   Assessment / Plan     Brief Patient Summary:  Curtis Pereyra is a 64 y.o. male recent diagnosis of bladder cancer, high-grade T1 status post TURBT admitted with UTI, possible diverticulitis on CT     Active Hospital Problems:  Active Hospital Problems    Diagnosis     **UTI (urinary tract infection)     Urinary urgency     Malignant neoplasm of overlapping sites of bladder      Plan:   Labs reviewed WBC trending down 12.83 from 15.8  Patient febrile overnight to 100.6, elevated temp,99, this morning  Urine culture remains pending; blood cultures negative at 24 hours    Will call micro lab and assess when culture result is expected; will need to ensure on culture sensitive antibiotics and afebrile prior to discharge    Continue medical management per hospitalist service; appreciate their assistance with this patient.    Discussed with Dr. Zuleta; anticipate likely discharge tomorrow depending on clinical course and culture results    No need for additional follow-up in office; continue plan as scheduled for  repeat resection 6 weeks; urology office to schedule and should notify him with details    Electronically signed by Sarah David MD, 11/22/24, 12:51 PM EST.

## 2024-11-22 NOTE — PROGRESS NOTES
[facility]   Hospitalist Progress Note  Date: 2024  Patient Name: Curtis Pereyra  : 1960  MRN: [id number]  Date of admission: 2024      Subjective   Subjective     Chief complaint: Fever    Summary:  58-year-old male with history of anxiety, BPH with LUTS, hypertension, urinary incontinence, recent history of transurethral resection of bladder tumor, hospitalized on 2024 for chief complaint of fever, urinalysis suggestive of UTI, urology consulted, placed on broad-spectrum antibiotics.  Sepsis present on admission, sepsis managed in the emergency room    Interval follow-up: Patient seen and examined this morning, no acute distress, no acute major overnight events, still febrile, Tmax 100.6 °F over the past 24 hours, intermittently tachycardic, urinating without difficulty, still has elevated white count of 12,000, hemoglobin 11.7, creatinine 0.92, BUN 20, potassium 3.3, sodium 138, replacement potassium ordered.    Review of systems:  All systems reviewed and negative except for weakness, fatigue, fevers    Objective   Objective     Vitals:   Temp:  [98.6 °F (37 °C)-100.6 °F (38.1 °C)] 99.9 °F (37.7 °C)  Heart Rate:  [] 88  Resp:  [16-18] 18  BP: (119-162)/(56-67) 133/63  Physical Exam               Constitutional: Awake, alert, no acute distress              Eyes: Pupils equal, sclerae anicteric, no conjunctival injection              HENT: NCAT, mucous membranes moist              Neck: Supple, no thyromegaly, no lymphadenopathy, trachea midline              Respiratory: Clear to auscultation bilaterally, nonlabored respirations               Cardiovascular: RRR, no murmurs, rubs, or gallops, palpable pedal pulses bilaterally              Gastrointestinal: Positive bowel sounds, soft, nontender, nondistended              Musculoskeletal: No bilateral ankle edema, no clubbing or cyanosis to extremities              Psychiatric: Appropriate affect, cooperative               Neurologic: Oriented x 3, strength symmetric in all extremities, Cranial Nerves grossly intact to confrontation, speech clear              Skin: No rashes     Result Review    Result Review:  I have personally reviewed the pertinent results from the past 24 hours to 11/22/2024 15:12 EST and agree with these findings:  [x]  Laboratory   CBC          11/21/2024    08:00 11/22/2024    04:37   CBC   WBC 15.80  12.83    RBC 4.64  3.89    Hemoglobin 13.7  11.7    Hematocrit 40.0  34.4    MCV 86.2  88.4    MCH 29.5  30.1    MCHC 34.3  34.0    RDW 13.1  13.1    Platelets 231  168      BMP          11/15/2024    06:47 11/21/2024    08:00 11/22/2024    04:37   BMP   BUN 19  21  20    Creatinine 0.92  1.06  0.92    Sodium 141  137  138    Potassium 4.0  3.7  3.3    Chloride 104  100  103    CO2 25.6  24.3  24.0    Calcium 9.2  9.8  8.6      LIVER FUNCTION TESTS:      Lab 11/21/24  0800   TOTAL PROTEIN 7.3   ALBUMIN 4.3   GLOBULIN 3.0   ALT (SGPT) 21   AST (SGOT) 18   BILIRUBIN 0.7   ALK PHOS 89       [x]  Microbiology   Microbiology Results (last 10 days)       Procedure Component Value - Date/Time    Urine Culture - Urine, Urine, Clean Catch [[id number]]  (Abnormal) Collected: 11/21/24 0821    Lab Status: Preliminary result Specimen: Urine, Clean Catch Updated: 11/22/24 1222     Urine Culture 50,000 CFU/mL Gram Negative Bacilli    Narrative:      Colonization of the urinary tract without infection is common. Treatment is discouraged unless the patient is symptomatic, pregnant, or undergoing an invasive urologic procedure.    Blood Culture - Blood, Arm, Left [[id number]]  (Normal) Collected: 11/21/24 0811    Lab Status: Preliminary result Specimen: Blood from Arm, Left Updated: 11/22/24 0830     Blood Culture No growth at 24 hours    COVID-19, FLU A/B, RSV PCR 1 HR TAT - Swab, Nasopharynx [[id number]]  (Normal) Collected: 11/21/24 0809    Lab Status: Final result Specimen: Swab from Nasopharynx Updated: 11/21/24 0900     COVID19  Not Detected     Influenza A PCR Not Detected     Influenza B PCR Not Detected     RSV, PCR Not Detected    Narrative:      Fact sheet for providers: https://www.fda.gov/media/012809/download    Fact sheet for patients: https://www.fda.gov/media/651161/download    Test performed by PCR.    Blood Culture - Blood, Arm, Right [[id number]]  (Normal) Collected: 11/21/24 0800    Lab Status: Preliminary result Specimen: Blood from Arm, Right Updated: 11/22/24 0815     Blood Culture No growth at 24 hours              [x]  Radiology CT Abdomen Pelvis With Contrast    Result Date: 11/21/2024  Impression: 1.Bladder wall thickening/indistinctness, concerning for cystitis. Please correlate with urinalysis. 2.Findings concerning for mild acute diverticulitis involving the proximal sigmoid colon. No free air or abscess formation is identified. Please correlate with symptomatology. 3.Additional findings as detailed above. Electronically Signed: Benjamin Burgess MD  11/21/2024 9:11 AM EST  Workstation ID: [id number]    XR Chest 1 View    Result Date: 11/21/2024  Impression: No acute process Electronically Signed: Jun Preston MD  11/21/2024 8:25 AM EST  Workstation ID: [id number]       []  EKG/Telemetry   No orders to display       []  Cardiology/Vascular   []  Pathology  [x]  Old records  []  Other:    Assessment & Plan   Assessment / Plan     Assessment/Plan:    Assessment:  Complicated UTI  UTI secondary to gram-negative bacilli  Malignant neoplasm of the bladder status post TURBT  Sepsis secondary to UTI  Hypertension    Plan:  Labs and imaging reviewed  Discussed with urology Dr. Walton, recommendations appreciated; will need to finalize antibiotics based on sensitivities  Continue ceftriaxone 2 g IV daily  Follow-up urine culture  Continue hydrochlorothiazide 25 mg daily  Continue lisinopril 20 mg daily  Discontinue IV fluids once fluid are complete  Potassium replacement via oral route  Continue tamsulosin 0.4 mg daily  A.m.  labs  Full code  DVT prophylaxis with Lovenox  Clinical course dictate further management  Discussed with nurse at the bedside    VTE Prophylaxis:  Pharmacologic VTE prophylaxis orders are present.        CODE STATUS:   Level Of Support Discussed With: Patient  Code Status (Patient has no pulse and is not breathing): CPR (Attempt to Resuscitate)  Medical Interventions (Patient has pulse or is breathing): Full Support        Electronically signed by Cinda Zuleta MD, 11/22/2024, 15:12 EST.    Portions of this documentation were transcribed electronically from a voice recognition software.  I confirm all data accurately represents the service(s) I performed at today's visit.

## 2024-11-22 NOTE — PLAN OF CARE
Goal Outcome Evaluation:  Plan of Care Reviewed With: patient        Progress: no change  Outcome Evaluation: aox4, had temp of 100.6 once this shift, given prn medication. all other vitals stable. no complaints of pain.

## 2024-11-23 VITALS
TEMPERATURE: 98.6 F | SYSTOLIC BLOOD PRESSURE: 121 MMHG | DIASTOLIC BLOOD PRESSURE: 62 MMHG | OXYGEN SATURATION: 97 % | WEIGHT: 315 LBS | HEART RATE: 89 BPM | HEIGHT: 77 IN | RESPIRATION RATE: 18 BRPM | BODY MASS INDEX: 37.19 KG/M2

## 2024-11-23 LAB
ALBUMIN SERPL-MCNC: 3.4 G/DL (ref 3.5–5.2)
ALP SERPL-CCNC: 66 U/L (ref 39–117)
ALT SERPL W P-5'-P-CCNC: 14 U/L (ref 1–41)
ANION GAP SERPL CALCULATED.3IONS-SCNC: 8.5 MMOL/L (ref 5–15)
AST SERPL-CCNC: 14 U/L (ref 1–40)
BACTERIA SPEC AEROBE CULT: ABNORMAL
BASOPHILS # BLD AUTO: 0.04 10*3/MM3 (ref 0–0.2)
BASOPHILS NFR BLD AUTO: 0.4 % (ref 0–1.5)
BILIRUB CONJ SERPL-MCNC: 0.2 MG/DL (ref 0–0.3)
BILIRUB INDIRECT SERPL-MCNC: 0.4 MG/DL
BILIRUB SERPL-MCNC: 0.6 MG/DL (ref 0–1.2)
BUN SERPL-MCNC: 14 MG/DL (ref 8–23)
BUN/CREAT SERPL: 16.3 (ref 7–25)
CALCIUM SPEC-SCNC: 8.4 MG/DL (ref 8.6–10.5)
CHLORIDE SERPL-SCNC: 103 MMOL/L (ref 98–107)
CO2 SERPL-SCNC: 25.5 MMOL/L (ref 22–29)
CREAT SERPL-MCNC: 0.86 MG/DL (ref 0.76–1.27)
DEPRECATED RDW RBC AUTO: 41.1 FL (ref 37–54)
EGFRCR SERPLBLD CKD-EPI 2021: 95.5 ML/MIN/1.73
EOSINOPHIL # BLD AUTO: 0.02 10*3/MM3 (ref 0–0.4)
EOSINOPHIL NFR BLD AUTO: 0.2 % (ref 0.3–6.2)
ERYTHROCYTE [DISTWIDTH] IN BLOOD BY AUTOMATED COUNT: 13 % (ref 12.3–15.4)
GLUCOSE SERPL-MCNC: 116 MG/DL (ref 65–99)
HCT VFR BLD AUTO: 33.7 % (ref 37.5–51)
HGB BLD-MCNC: 11.4 G/DL (ref 13–17.7)
IMM GRANULOCYTES # BLD AUTO: 0.03 10*3/MM3 (ref 0–0.05)
IMM GRANULOCYTES NFR BLD AUTO: 0.3 % (ref 0–0.5)
LYMPHOCYTES # BLD AUTO: 0.87 10*3/MM3 (ref 0.7–3.1)
LYMPHOCYTES NFR BLD AUTO: 8.6 % (ref 19.6–45.3)
MAGNESIUM SERPL-MCNC: 1.8 MG/DL (ref 1.6–2.4)
MCH RBC QN AUTO: 29.5 PG (ref 26.6–33)
MCHC RBC AUTO-ENTMCNC: 33.8 G/DL (ref 31.5–35.7)
MCV RBC AUTO: 87.3 FL (ref 79–97)
MONOCYTES # BLD AUTO: 0.91 10*3/MM3 (ref 0.1–0.9)
MONOCYTES NFR BLD AUTO: 9 % (ref 5–12)
NEUTROPHILS NFR BLD AUTO: 8.26 10*3/MM3 (ref 1.7–7)
NEUTROPHILS NFR BLD AUTO: 81.5 % (ref 42.7–76)
NRBC BLD AUTO-RTO: 0 /100 WBC (ref 0–0.2)
PHOSPHATE SERPL-MCNC: 2.4 MG/DL (ref 2.5–4.5)
PLATELET # BLD AUTO: 148 10*3/MM3 (ref 140–450)
PMV BLD AUTO: 8.9 FL (ref 6–12)
POTASSIUM SERPL-SCNC: 3.4 MMOL/L (ref 3.5–5.2)
PROT SERPL-MCNC: 6 G/DL (ref 6–8.5)
RBC # BLD AUTO: 3.86 10*6/MM3 (ref 4.14–5.8)
SODIUM SERPL-SCNC: 137 MMOL/L (ref 136–145)
WBC NRBC COR # BLD AUTO: 10.13 10*3/MM3 (ref 3.4–10.8)

## 2024-11-23 PROCEDURE — 80076 HEPATIC FUNCTION PANEL: CPT | Performed by: FAMILY MEDICINE

## 2024-11-23 PROCEDURE — 99239 HOSP IP/OBS DSCHRG MGMT >30: CPT | Performed by: FAMILY MEDICINE

## 2024-11-23 PROCEDURE — 80048 BASIC METABOLIC PNL TOTAL CA: CPT | Performed by: FAMILY MEDICINE

## 2024-11-23 PROCEDURE — 25010000002 ENOXAPARIN PER 10 MG: Performed by: INTERNAL MEDICINE

## 2024-11-23 PROCEDURE — 85025 COMPLETE CBC W/AUTO DIFF WBC: CPT | Performed by: FAMILY MEDICINE

## 2024-11-23 PROCEDURE — 25010000002 CEFTRIAXONE PER 250 MG: Performed by: FAMILY MEDICINE

## 2024-11-23 PROCEDURE — 84100 ASSAY OF PHOSPHORUS: CPT | Performed by: FAMILY MEDICINE

## 2024-11-23 PROCEDURE — 83735 ASSAY OF MAGNESIUM: CPT | Performed by: FAMILY MEDICINE

## 2024-11-23 RX ORDER — POTASSIUM CHLORIDE 750 MG/1
20 CAPSULE, EXTENDED RELEASE ORAL ONCE
Status: COMPLETED | OUTPATIENT
Start: 2024-11-23 | End: 2024-11-23

## 2024-11-23 RX ORDER — LEVOFLOXACIN 750 MG/1
750 TABLET, FILM COATED ORAL DAILY
Qty: 7 TABLET | Refills: 0 | Status: SHIPPED | OUTPATIENT
Start: 2024-11-23 | End: 2024-11-30

## 2024-11-23 RX ADMIN — OXYBUTYNIN CHLORIDE 5 MG: 5 TABLET, EXTENDED RELEASE ORAL at 08:49

## 2024-11-23 RX ADMIN — PYRIDOXINE HCL TAB 50 MG 100 MG: 50 TAB at 08:49

## 2024-11-23 RX ADMIN — ENOXAPARIN SODIUM 40 MG: 100 INJECTION SUBCUTANEOUS at 08:48

## 2024-11-23 RX ADMIN — POTASSIUM CHLORIDE 20 MEQ: 750 CAPSULE, EXTENDED RELEASE ORAL at 08:48

## 2024-11-23 RX ADMIN — Medication 100 MCG: at 08:49

## 2024-11-23 RX ADMIN — VENLAFAXINE HYDROCHLORIDE 225 MG: 150 CAPSULE, EXTENDED RELEASE ORAL at 08:49

## 2024-11-23 RX ADMIN — MELOXICAM 15 MG: 7.5 TABLET ORAL at 08:49

## 2024-11-23 RX ADMIN — LISINOPRIL 20 MG: 20 TABLET ORAL at 08:49

## 2024-11-23 RX ADMIN — Medication 10 ML: at 08:49

## 2024-11-23 RX ADMIN — CEFTRIAXONE SODIUM 2000 MG: 2 INJECTION, POWDER, FOR SOLUTION INTRAMUSCULAR; INTRAVENOUS at 08:48

## 2024-11-23 RX ADMIN — HYDROCHLOROTHIAZIDE 25 MG: 25 TABLET ORAL at 08:49

## 2024-11-23 NOTE — DISCHARGE SUMMARY
[facility]         HOSPITALIST  DISCHARGE SUMMARY    Patient Name: Curtis Pereyra  : 1960  MRN: [id number]    Date of Admission: 2024  Date of Discharge:  2024    Primary Care Physician: Audelia Villafana APRN    Consults       Date and Time Order Name Status Description    2024  9:27 AM Inpatient Hospitalist Consult      2024  9:15 AM IP Consult to Urology              Active and Resolved Hospital Problems:  Complicated UTI due to Pseudomonas  UTI secondary to Pseudomonas  Malignant neoplasm of the bladder status post TURBT  Sepsis secondary to UTI  Hypertension    Active Hospital Problems    Diagnosis POA    **UTI (urinary tract infection) [N39.0] Yes    Urinary urgency [R39.15] Unknown    Malignant neoplasm of overlapping sites of bladder [C67.8] Yes      Resolved Hospital Problems   No resolved problems to display.       Hospital Course     Hospital Course:  58-year-old male with history of anxiety, BPH with LUTS, hypertension, urinary incontinence, recent history of transurethral resection of bladder tumor, hospitalized on 2024 for chief complaint of fever, urinalysis suggestive of UTI, urology consulted, placed on broad-spectrum antibiotics.  Sepsis present on admission, sepsis managed in the emergency room, white blood cell count normalized, urine culture grew Pseudomonas, de-escalated to sensitive floroquinolone, discharged in hemodynamically stable addition on 2024 to follow-up with urology as outpatient.    Day of Discharge     Vital Signs:  Temp:  [98.2 °F (36.8 °C)-101.3 °F (38.5 °C)] 98.6 °F (37 °C)  Heart Rate:  [77-99] 89  Resp:  [18] 18  BP: (121-144)/(62-81) 121/62  Review of systems:  All systems reviewed and negative except for weakness    Physical Exam                         Constitutional: Awake, alert, no acute distress              Eyes: Pupils equal, sclerae anicteric, no conjunctival injection              HENT: NCAT, mucous  membranes moist              Neck: Supple, no thyromegaly, no lymphadenopathy, trachea midline              Respiratory: Clear to auscultation bilaterally, nonlabored respirations               Cardiovascular: RRR, no murmurs, rubs, or gallops, palpable pedal pulses bilaterally              Gastrointestinal: Positive bowel sounds, soft, nontender, nondistended              Musculoskeletal: No bilateral ankle edema, no clubbing or cyanosis to extremities              Psychiatric: Appropriate affect, cooperative              Neurologic: Oriented x 3, strength symmetric in all extremities, Cranial Nerves grossly intact to confrontation, speech clear              Skin: No rashes       Discharge Details        Discharge Medications        New Medications        Instructions Start Date   levoFLOXacin 750 MG tablet  Commonly known as: Levaquin   750 mg, Oral, Daily             Continue These Medications        Instructions Start Date   lisinopril-hydrochlorothiazide 20-25 MG per tablet  Commonly known as: PRINZIDE,ZESTORETIC   1 tablet, Every Morning      meloxicam 15 MG tablet  Commonly known as: MOBIC   15 mg, Every Morning      oxybutynin 5 MG tablet  Commonly known as: DITROPAN   5 mg, Oral, 3 Times Daily PRN, May cause constipation      oxyCODONE-acetaminophen 5-325 MG per tablet  Commonly known as: Percocet   May take 0.5-2 tablets every 4-8 hours as needed for severe pain      phenazopyridine 200 MG tablet  Commonly known as: Pyridium   200 mg, Oral, 3 Times Daily PRN, Will turn urine orange      pyridoxine 100 MG tablet  Commonly known as: VITAMIN B-6   100 mg, Daily      solifenacin 5 MG tablet  Commonly known as: VESICARE   5 mg, Oral, Daily      tamsulosin 0.4 MG capsule 24 hr capsule  Commonly known as: FLOMAX   0.8 mg, Nightly      venlafaxine XR 75 MG 24 hr capsule  Commonly known as: EFFEXOR-XR   3 capsules, Daily      vitamin B-12 100 MCG tablet  Commonly known as: CYANOCOBALAMIN   100 mcg, Daily                No Known Allergies    Discharge Disposition:  Home or Self Care    Diet:  Hospital:  Diet Order   Procedures    Diet: Regular/House; Fluid Consistency: Thin (IDDSI 0)       Discharge Activity:       CODE STATUS:  Code Status and Medical Interventions: CPR (Attempt to Resuscitate); Full Support   Ordered at: 11/21/24 1301     Level Of Support Discussed With:    Patient     Code Status (Patient has no pulse and is not breathing):    CPR (Attempt to Resuscitate)     Medical Interventions (Patient has pulse or is breathing):    Full Support         No future appointments.    Additional Instructions for the Follow-ups that You Need to Schedule       Discharge Follow-up with PCP   As directed       Currently Documented PCP:    Audelia Villafana APRN    PCP Phone Number:    [phone]     Follow Up Details: 3 to 7 days                Pertinent  and/or Most Recent Results     PROCEDURES:   CT Abdomen Pelvis With Contrast    Result Date: 11/21/2024  CT ABDOMEN PELVIS W CONTRAST Date of Exam: 11/21/2024 9:00 AM EST Indication: fever, pelvic pressure, post op. Comparison: None available. Technique: Axial CT images were obtained of the abdomen and pelvis after the uneventful intravenous administration of iodinated contrast. Reconstructed coronal and sagittal images were also obtained. Automated exposure control and iterative construction methods were used. Findings: Liver: The liver is unremarkable in morphology and decreased in attenuation. There are multiple liver cysts and subcentimeter hypodensities which are too small to characterize. No biliary dilation is seen. Gallbladder: Unremarkable. Pancreas: Unremarkable. Spleen: Multiple splenic granulomas. Adrenal glands: Unremarkable. Genitourinary tract: Small bilateral renal cysts noted. Kidneys are otherwise unremarkable. No hydronephrosis is seen. The visualized portions of the ureters are unremarkable. Bladder wall thickening/indistinctness, concerning for cystitis.  Prostate gland is mildly prominent. Gastrointestinal tract: Colonic diverticulosis is present. There is mild inflammatory stranding about an inflamed diverticulum of the proximal sigmoid colon (series 3, image 75), compatible with acute diverticulitis. Hollow viscera appear otherwise unremarkable. There is no evidence of bowel obstruction. Appendix: No findings to suggest acute appendicitis. Other findings: No free air or free fluid is identified. No pathologically enlarged lymph nodes are seen. Vascular calcifications are present. The IVC is unremarkable. Bones and soft tissues: No acute or suspicious osseous or soft tissue lesion is identified. Lung bases: The visualized lung bases are clear.     Impression: 1.Bladder wall thickening/indistinctness, concerning for cystitis. Please correlate with urinalysis. 2.Findings concerning for mild acute diverticulitis involving the proximal sigmoid colon. No free air or abscess formation is identified. Please correlate with symptomatology. 3.Additional findings as detailed above. Electronically Signed: Benjamin Burgess MD  11/21/2024 9:11 AM EST  Workstation ID: [id number]    XR Chest 1 View    Result Date: 11/21/2024  XR CHEST 1 VW Date of Exam: 11/21/2024 8:04 AM EST Indication: fever Comparison: 2/7/2020 and prior Findings: Study is limited by overlying support and monitoring apparatus. The heart and mediastinal contours are within normal limits. The lungs appear to be grossly clear. There is evidence of old granulomatous disease. Osseous structures demonstrate no acute abnormality     Impression: No acute process Electronically Signed: Jun Preston MD  11/21/2024 8:25 AM EST  Workstation ID: [id number]       LAB RESULTS:      Lab 11/23/24  0512 11/22/24  0437 11/21/24  0800   WBC 10.13 12.83* 15.80*   HEMOGLOBIN 11.4* 11.7* 13.7   HEMATOCRIT 33.7* 34.4* 40.0   PLATELETS 148 168 231   NEUTROS ABS 8.26* 10.68* 13.93*   IMMATURE GRANS (ABS) 0.03 0.05 0.06*   LYMPHS ABS 0.87  0.92 0.70   MONOS ABS 0.91* 1.14* 1.08*   EOS ABS 0.02 0.01 0.00   MCV 87.3 88.4 86.2   LACTATE  --   --  2.0         Lab 11/23/24  0512 11/22/24  0437 11/21/24  0800   SODIUM 137 138 137   POTASSIUM 3.4* 3.3* 3.7   CHLORIDE 103 103 100   CO2 25.5 24.0 24.3   ANION GAP 8.5 11.0 12.7   BUN 14 20 21   CREATININE 0.86 0.92 1.06   EGFR 95.5 91.7 77.4   GLUCOSE 116* 138* 130*   CALCIUM 8.4* 8.6 9.8   MAGNESIUM 1.8  --   --    PHOSPHORUS 2.4*  --   --          Lab 11/23/24  0512 11/21/24  0800   TOTAL PROTEIN 6.0 7.3   ALBUMIN 3.4* 4.3   GLOBULIN  --  3.0   ALT (SGPT) 14 21   AST (SGOT) 14 18   BILIRUBIN 0.6 0.7   INDIRECT BILIRUBIN 0.4  --    BILIRUBIN DIRECT 0.2  --    ALK PHOS 66 89                     Brief Urine Lab Results  (Last result in the past 365 days)        Color   Clarity   Blood   Leuk Est   Nitrite   Protein   CREAT   Urine HCG        11/21/24 0821 Yellow   Cloudy   Large (3+)   Large (3+)   Positive   100 mg/dL (2+)                 Microbiology Results (last 10 days)       Procedure Component Value - Date/Time    Urine Culture - Urine, Urine, Clean Catch [[id number]]  (Abnormal)  (Susceptibility) Collected: 11/21/24 0821    Lab Status: Final result Specimen: Urine, Clean Catch Updated: 11/23/24 1018     Urine Culture 50,000 CFU/mL Pseudomonas aeruginosa    Narrative:      Colonization of the urinary tract without infection is common. Treatment is discouraged unless the patient is symptomatic, pregnant, or undergoing an invasive urologic procedure.    Susceptibility        Pseudomonas aeruginosa      MARY      Cefepime Susceptible      Ceftazidime Susceptible      Ciprofloxacin Susceptible      Levofloxacin Susceptible      Piperacillin + Tazobactam Susceptible      Tobramycin Susceptible                           Blood Culture - Blood, Arm, Left [[id number]]  (Normal) Collected: 11/21/24 0811    Lab Status: Preliminary result Specimen: Blood from Arm, Left Updated: 11/23/24 0830     Blood Culture No growth  at 2 days    COVID-19, FLU A/B, RSV PCR 1 HR TAT - Swab, Nasopharynx [[id number]]  (Normal) Collected: 11/21/24 0809    Lab Status: Final result Specimen: Swab from Nasopharynx Updated: 11/21/24 0900     COVID19 Not Detected     Influenza A PCR Not Detected     Influenza B PCR Not Detected     RSV, PCR Not Detected    Narrative:      Fact sheet for providers: https://www.fda.gov/media/009708/download    Fact sheet for patients: https://www.fda.gov/media/338089/download    Test performed by PCR.    Blood Culture - Blood, Arm, Right [[id number]]  (Normal) Collected: 11/21/24 0800    Lab Status: Preliminary result Specimen: Blood from Arm, Right Updated: 11/23/24 0815     Blood Culture No growth at 2 days            CT Abdomen Pelvis With Contrast    Result Date: 11/21/2024  Impression: Impression: 1.Bladder wall thickening/indistinctness, concerning for cystitis. Please correlate with urinalysis. 2.Findings concerning for mild acute diverticulitis involving the proximal sigmoid colon. No free air or abscess formation is identified. Please correlate with symptomatology. 3.Additional findings as detailed above. Electronically Signed: Benjamin Burgess MD  11/21/2024 9:11 AM EST  Workstation ID: [id number]    XR Chest 1 View    Result Date: 11/21/2024  Impression: Impression: No acute process Electronically Signed: Jun Preston MD  11/21/2024 8:25 AM EST  Workstation ID: [id number]                 Labs Pending at Discharge:  Pending Labs       Order Current Status    Blood Culture - Blood, Arm, Left Preliminary result    Blood Culture - Blood, Arm, Right Preliminary result              Time spent on Discharge including face to face service:  35 minutes    Electronically signed by Cinda Zuleta MD, 11/23/24, 1:03 PM EST.    Portions of this documentation were transcribed electronically from a voice recognition software.  I confirm all data accurately represents the service(s) I performed at today's visit.

## 2024-11-23 NOTE — PLAN OF CARE
Goal Outcome Evaluation: No complaints from patient today. Patient was hoping to discharge home. I told patient the MD said probably tomorrow. NS infusing @100/hr. Will continue plan of care.

## 2024-11-23 NOTE — PLAN OF CARE
Goal Outcome Evaluation:  Plan of Care Reviewed With: patient        Progress: improving  Outcome Evaluation: patient alert and oriented did give tylenol for temp of 101.3

## 2024-11-24 ENCOUNTER — READMISSION MANAGEMENT (OUTPATIENT)
Dept: CALL CENTER | Facility: HOSPITAL | Age: 64
End: 2024-11-24
Payer: COMMERCIAL

## 2024-11-24 NOTE — OUTREACH NOTE
Prep Survey      Flowsheet Row Responses   [facility] facility patient discharged from? [facility]   Is LACE score < 7 ? No   Eligibility Readm Mgmt   Discharge diagnosis *UTI (urinary tract infection)   Does the patient have one of the following disease processes/diagnoses(primary or secondary)? Other   Does the patient have Home health ordered? No   Is there a DME ordered? No   Prep survey completed? Yes            RACHNA DANIEL - Registered Nurse

## 2024-11-26 LAB
BACTERIA SPEC AEROBE CULT: NORMAL
BACTERIA SPEC AEROBE CULT: NORMAL

## 2024-11-30 LAB
QT INTERVAL: 407 MS
QTC INTERVAL: 430 MS

## 2024-12-02 ENCOUNTER — READMISSION MANAGEMENT (OUTPATIENT)
Dept: CALL CENTER | Facility: HOSPITAL | Age: 64
End: 2024-12-02
Payer: COMMERCIAL

## 2024-12-02 NOTE — OUTREACH NOTE
Medical Week 1 Survey      Flowsheet Row Responses   [facility] patient discharged from? [facility]   Does the patient have one of the following disease processes/diagnoses(primary or secondary)? Other   Week 1 attempt successful? Yes   Call start time 0910   Call end time 0912   Discharge diagnosis *UTI (urinary tract infection)   Meds reviewed with patient/caregiver? Yes   Is the patient having any side effects they believe may be caused by any medication additions or changes? No   Does the patient have all medications ordered at discharge? Yes   Is the patient taking all medications as directed (includes completed medication regime)? Yes   Does the patient have a primary care provider?  Yes   Does the patient have an appointment with their PCP within 7 days of discharge? Yes   Has the patient kept scheduled appointments due by today? Yes   Has home health visited the patient within 72 hours of discharge? N/A   Psychosocial issues? No   Did the patient receive a copy of their discharge instructions? Yes   What is the patient's perception of their health status since discharge? Improving   Is the patient/caregiver able to teach back the hierarchy of who to call/visit for symptoms/problems? PCP, Specialist, Home health nurse, Urgent Care, ED, 911 Yes   Week 1 call completed? Yes   Graduated Yes   Would this patient benefit from a Referral to Amb Social Work? No   Is the patient interested in additional calls from an ambulatory ? No   Graduated/Revoked comments Pt reports doing well, no concerns at this time   Call end time 0912            Leslie RODRIGUEZ - Registered Nurse

## 2024-12-12 ENCOUNTER — TELEPHONE (OUTPATIENT)
Dept: UROLOGY | Age: 64
End: 2024-12-12
Payer: COMMERCIAL

## 2024-12-12 DIAGNOSIS — R31.0 GROSS HEMATURIA: ICD-10-CM

## 2024-12-12 DIAGNOSIS — C67.8 MALIGNANT NEOPLASM OF OVERLAPPING SITES OF BLADDER: Primary | ICD-10-CM

## 2024-12-12 NOTE — TELEPHONE ENCOUNTER
SPOKE TO WIFE AND ADVISED DR PRABHAKAR WANTED TO WAIT UNTIL EARLY JAN. WIFE WANTS TO SCHED 1/10. ADVISED HER WE NEED A UCX 1/3. WIFE EXPRESSED UNDERSTANDING AND IS AGREEABLE. WILL SEND LAB ORDER AND INFO.

## 2024-12-12 NOTE — TELEPHONE ENCOUNTER
PATIENT'S WIFE, ADIN, CALLED.  HE HAS BLADDER CANCER, AND HAD SURGERY BY DR. CHAUHAN.  DR. CHAUHAN WAS GOING TO GO BACK IN TO DO MORE SURGERY IN ABOUT 6 WEEKS.  WIFE SAID THAT WOULD BE AT THE END OF DECEMBER.  THEY HAVE NOT BEEN CONTACTED TO SCHEDULE.    CALL PATIENT [phone] OR WIFE, ADIN, [phone].

## 2025-01-03 ENCOUNTER — LAB (OUTPATIENT)
Facility: HOSPITAL | Age: 65
End: 2025-01-03
Payer: MEDICARE

## 2025-01-03 DIAGNOSIS — C67.8 MALIGNANT NEOPLASM OF OVERLAPPING SITES OF BLADDER: ICD-10-CM

## 2025-01-03 DIAGNOSIS — R31.0 GROSS HEMATURIA: ICD-10-CM

## 2025-01-03 PROCEDURE — 87086 URINE CULTURE/COLONY COUNT: CPT

## 2025-01-03 PROCEDURE — 87077 CULTURE AEROBIC IDENTIFY: CPT

## 2025-01-03 PROCEDURE — 87186 SC STD MICRODIL/AGAR DIL: CPT

## 2025-01-05 LAB — BACTERIA SPEC AEROBE CULT: ABNORMAL

## 2025-01-07 ENCOUNTER — TELEPHONE (OUTPATIENT)
Dept: UROLOGY | Age: 65
End: 2025-01-07
Payer: COMMERCIAL

## 2025-01-07 NOTE — PRE-PROCEDURE INSTRUCTIONS
IMPORTANT INSTRUCTIONS - PRE-ADMISSION TESTING  DO NOT EAT/DRINK  OR CHEW anything after midnight the night before your procedure.      Take the following medications the morning of your procedure with JUST A SIP OF WATER:  __FLOMAX, EFFEXOR _____________________________________________________________________________________________________________________________________________________________________________________    DO NOT BRING your medications to the hospital with you, UNLESS something has changed since your PRE-Admission Testing appointment.  Hold all vitamins, supplements, and NSAIDS (Non- steroidal anti-inflammatory meds) for one week prior to surgery (you MAY take Tylenol or Acetaminophen).  If you are diabetic, check your blood sugar the morning of your procedure. If it is less than 70 or if you are feeling symptomatic, call the following number for further instructions: [phone]  Use your inhalers/nebulizers as usual, the morning of your procedure. BRING YOUR INHALERS with you.   Bring your CPAP or BIPAP to hospital, ONLY IF YOU WILL BE SPENDING THE NIGHT.   Make sure you have a ride home and have someone who will stay with you the day of your procedure after you go home.  If you have any questions, please call your Pre-Admission Testing Nurse, CARROLL__ at [phone]   Per anesthesia request, do not smoke for 24 hours before your procedure or as instructed by your surgeon.

## 2025-01-07 NOTE — TELEPHONE ENCOUNTER
LEFT DETAILED MESSAGE THAT WE NEED TO MOVE HIS JOSE FROM 1/10 TO 1/13 DUE TO POS UCX. ADVISED THAT DR PRABHAKAR WILL BE SENDING IN AB[facility] TO PHARM ON CHART. ASKED FOR A CALL BACK WITH ANY QUESTIONS.

## 2025-01-08 ENCOUNTER — TELEPHONE (OUTPATIENT)
Dept: UROLOGY | Age: 65
End: 2025-01-08
Payer: COMMERCIAL

## 2025-01-08 DIAGNOSIS — R82.71 BACTERIURIA, ASYMPTOMATIC: Primary | ICD-10-CM

## 2025-01-08 RX ORDER — LEVOFLOXACIN 500 MG/1
500 TABLET, FILM COATED ORAL DAILY
Qty: 5 TABLET | Refills: 0 | Status: SHIPPED | OUTPATIENT
Start: 2025-01-08 | End: 2025-01-13

## 2025-01-08 NOTE — TELEPHONE ENCOUNTER
ADVISED PT OF [facility] AND R/S HIS SURG FROM 1/10 TO 1/13. PT EXPRESSED UNDERSTANDING AND IS AGREEABLE.

## 2025-01-08 NOTE — TELEPHONE ENCOUNTER
----- Message from Sarah David sent at 1/8/2025  1:25 PM EST -----  Please notify patient to start abx sent to pharmacy.     Believe Levaquin to be the best choice of antibiotic at this point according to the sensitivity profile.      Patient needs to be aware that there is a black box warning on this class of medication for spontaneous tendon rupture.      He is to stop taking this medication and notify us should he experience any tendon pain, swelling, or inflammation.    Thank you

## 2025-01-13 ENCOUNTER — HOSPITAL ENCOUNTER (OUTPATIENT)
Facility: HOSPITAL | Age: 65
Setting detail: HOSPITAL OUTPATIENT SURGERY
Discharge: HOME | End: 2025-01-13
Attending: UROLOGY | Admitting: UROLOGY
Payer: MEDICARE

## 2025-01-13 ENCOUNTER — ANESTHESIA EVENT (OUTPATIENT)
Dept: PERIOP | Facility: HOSPITAL | Age: 65
End: 2025-01-13
Payer: MEDICARE

## 2025-01-13 ENCOUNTER — ANESTHESIA (OUTPATIENT)
Dept: PERIOP | Facility: HOSPITAL | Age: 65
End: 2025-01-13
Payer: MEDICARE

## 2025-01-13 VITALS
HEART RATE: 97 BPM | RESPIRATION RATE: 18 BRPM | HEIGHT: 75 IN | OXYGEN SATURATION: 95 % | WEIGHT: 315 LBS | DIASTOLIC BLOOD PRESSURE: 87 MMHG | TEMPERATURE: 97 F | SYSTOLIC BLOOD PRESSURE: 144 MMHG | BODY MASS INDEX: 39.17 KG/M2

## 2025-01-13 DIAGNOSIS — R31.0 GROSS HEMATURIA: ICD-10-CM

## 2025-01-13 DIAGNOSIS — N32.89 BLADDER MASS: ICD-10-CM

## 2025-01-13 DIAGNOSIS — C67.8 MALIGNANT NEOPLASM OF OVERLAPPING SITES OF BLADDER: ICD-10-CM

## 2025-01-13 PROCEDURE — S0260 H&P FOR SURGERY: HCPCS | Performed by: UROLOGY

## 2025-01-13 PROCEDURE — 25010000002 SUGAMMADEX 200 MG/2ML SOLUTION: Performed by: NURSE ANESTHETIST, CERTIFIED REGISTERED

## 2025-01-13 PROCEDURE — 25010000002 MIDAZOLAM PER 1MG: Performed by: ANESTHESIOLOGY

## 2025-01-13 PROCEDURE — 25010000002 HYDROMORPHONE 1 MG/ML SOLUTION: Performed by: NURSE ANESTHETIST, CERTIFIED REGISTERED

## 2025-01-13 PROCEDURE — 25010000002 ONDANSETRON PER 1 MG: Performed by: NURSE ANESTHETIST, CERTIFIED REGISTERED

## 2025-01-13 PROCEDURE — 25010000002 DEXAMETHASONE PER 1 MG: Performed by: NURSE ANESTHETIST, CERTIFIED REGISTERED

## 2025-01-13 PROCEDURE — 25010000002 FENTANYL CITRATE (PF) 50 MCG/ML SOLUTION: Performed by: NURSE ANESTHETIST, CERTIFIED REGISTERED

## 2025-01-13 PROCEDURE — 25010000002 PROPOFOL 10 MG/ML EMULSION: Performed by: NURSE ANESTHETIST, CERTIFIED REGISTERED

## 2025-01-13 PROCEDURE — 25010000002 CEFTAZIDIME 2 G RECONSTITUTED SOLUTION 1 EACH VIAL: Performed by: UROLOGY

## 2025-01-13 PROCEDURE — 52235 CYSTOSCOPY AND TREATMENT: CPT | Performed by: UROLOGY

## 2025-01-13 PROCEDURE — 25810000003 LACTATED RINGERS PER 1000 ML: Performed by: ANESTHESIOLOGY

## 2025-01-13 PROCEDURE — 25010000002 LIDOCAINE PF 2% 2 % SOLUTION: Performed by: NURSE ANESTHETIST, CERTIFIED REGISTERED

## 2025-01-13 PROCEDURE — 88307 TISSUE EXAM BY PATHOLOGIST: CPT | Performed by: UROLOGY

## 2025-01-13 RX ORDER — PHENYLEPHRINE HCL IN 0.9% NACL 1 MG/10 ML
SYRINGE (ML) INTRAVENOUS AS NEEDED
Status: DISCONTINUED | OUTPATIENT
Start: 2025-01-13 | End: 2025-01-13 | Stop reason: SURG

## 2025-01-13 RX ORDER — ONDANSETRON 4 MG/1
4 TABLET, ORALLY DISINTEGRATING ORAL ONCE AS NEEDED
Status: DISCONTINUED | OUTPATIENT
Start: 2025-01-13 | End: 2025-01-13 | Stop reason: HOSPADM

## 2025-01-13 RX ORDER — ONDANSETRON 2 MG/ML
4 INJECTION INTRAMUSCULAR; INTRAVENOUS ONCE AS NEEDED
Status: DISCONTINUED | OUTPATIENT
Start: 2025-01-13 | End: 2025-01-13 | Stop reason: HOSPADM

## 2025-01-13 RX ORDER — PHENAZOPYRIDINE HYDROCHLORIDE 200 MG/1
200 TABLET, FILM COATED ORAL 3 TIMES DAILY PRN
Qty: 20 TABLET | Refills: 0 | Status: SHIPPED | OUTPATIENT
Start: 2025-01-13

## 2025-01-13 RX ORDER — PROMETHAZINE HYDROCHLORIDE 12.5 MG/1
25 TABLET ORAL ONCE AS NEEDED
Status: DISCONTINUED | OUTPATIENT
Start: 2025-01-13 | End: 2025-01-13 | Stop reason: HOSPADM

## 2025-01-13 RX ORDER — ACETAMINOPHEN 500 MG
1000 TABLET ORAL ONCE
Status: COMPLETED | OUTPATIENT
Start: 2025-01-13 | End: 2025-01-13

## 2025-01-13 RX ORDER — ONDANSETRON 2 MG/ML
INJECTION INTRAMUSCULAR; INTRAVENOUS AS NEEDED
Status: DISCONTINUED | OUTPATIENT
Start: 2025-01-13 | End: 2025-01-13 | Stop reason: SURG

## 2025-01-13 RX ORDER — ACETAMINOPHEN 325 MG/1
650 TABLET ORAL ONCE
Status: DISCONTINUED | OUTPATIENT
Start: 2025-01-13 | End: 2025-01-13 | Stop reason: HOSPADM

## 2025-01-13 RX ORDER — ROCURONIUM BROMIDE 10 MG/ML
INJECTION, SOLUTION INTRAVENOUS AS NEEDED
Status: DISCONTINUED | OUTPATIENT
Start: 2025-01-13 | End: 2025-01-13 | Stop reason: SURG

## 2025-01-13 RX ORDER — MEPERIDINE HYDROCHLORIDE 25 MG/ML
12.5 INJECTION INTRAMUSCULAR; INTRAVENOUS; SUBCUTANEOUS
Status: DISCONTINUED | OUTPATIENT
Start: 2025-01-13 | End: 2025-01-13 | Stop reason: HOSPADM

## 2025-01-13 RX ORDER — OXYCODONE AND ACETAMINOPHEN 5; 325 MG/1; MG/1
.5-2 TABLET ORAL EVERY 6 HOURS PRN
Qty: 12 TABLET | Refills: 0 | Status: SHIPPED | OUTPATIENT
Start: 2025-01-13

## 2025-01-13 RX ORDER — MIDAZOLAM HYDROCHLORIDE 2 MG/2ML
2 INJECTION, SOLUTION INTRAMUSCULAR; INTRAVENOUS ONCE
Status: COMPLETED | OUTPATIENT
Start: 2025-01-13 | End: 2025-01-13

## 2025-01-13 RX ORDER — PROMETHAZINE HYDROCHLORIDE 12.5 MG/1
12.5 TABLET ORAL ONCE AS NEEDED
Status: DISCONTINUED | OUTPATIENT
Start: 2025-01-13 | End: 2025-01-13 | Stop reason: HOSPADM

## 2025-01-13 RX ORDER — PROMETHAZINE HYDROCHLORIDE 25 MG/1
25 SUPPOSITORY RECTAL ONCE AS NEEDED
Status: DISCONTINUED | OUTPATIENT
Start: 2025-01-13 | End: 2025-01-13 | Stop reason: HOSPADM

## 2025-01-13 RX ORDER — MAGNESIUM HYDROXIDE 1200 MG/15ML
LIQUID ORAL AS NEEDED
Status: DISCONTINUED | OUTPATIENT
Start: 2025-01-13 | End: 2025-01-13 | Stop reason: HOSPADM

## 2025-01-13 RX ORDER — OXYCODONE HYDROCHLORIDE 5 MG/1
5 TABLET ORAL
Status: DISCONTINUED | OUTPATIENT
Start: 2025-01-13 | End: 2025-01-13 | Stop reason: HOSPADM

## 2025-01-13 RX ORDER — IBUPROFEN 600 MG/1
600 TABLET, FILM COATED ORAL EVERY 6 HOURS PRN
Status: DISCONTINUED | OUTPATIENT
Start: 2025-01-13 | End: 2025-01-13 | Stop reason: HOSPADM

## 2025-01-13 RX ORDER — FENTANYL CITRATE 50 UG/ML
INJECTION, SOLUTION INTRAMUSCULAR; INTRAVENOUS AS NEEDED
Status: DISCONTINUED | OUTPATIENT
Start: 2025-01-13 | End: 2025-01-13 | Stop reason: SURG

## 2025-01-13 RX ORDER — SODIUM CHLORIDE, SODIUM LACTATE, POTASSIUM CHLORIDE, CALCIUM CHLORIDE 600; 310; 30; 20 MG/100ML; MG/100ML; MG/100ML; MG/100ML
9 INJECTION, SOLUTION INTRAVENOUS CONTINUOUS PRN
Status: DISCONTINUED | OUTPATIENT
Start: 2025-01-13 | End: 2025-01-13 | Stop reason: HOSPADM

## 2025-01-13 RX ORDER — DEXAMETHASONE SODIUM PHOSPHATE 4 MG/ML
INJECTION, SOLUTION INTRA-ARTICULAR; INTRALESIONAL; INTRAMUSCULAR; INTRAVENOUS; SOFT TISSUE AS NEEDED
Status: DISCONTINUED | OUTPATIENT
Start: 2025-01-13 | End: 2025-01-13 | Stop reason: SURG

## 2025-01-13 RX ORDER — OXYBUTYNIN CHLORIDE 5 MG/1
5 TABLET ORAL 3 TIMES DAILY PRN
Qty: 60 TABLET | Refills: 0 | Status: SHIPPED | OUTPATIENT
Start: 2025-01-13

## 2025-01-13 RX ORDER — LIDOCAINE HYDROCHLORIDE 20 MG/ML
INJECTION, SOLUTION EPIDURAL; INFILTRATION; INTRACAUDAL; PERINEURAL AS NEEDED
Status: DISCONTINUED | OUTPATIENT
Start: 2025-01-13 | End: 2025-01-13 | Stop reason: SURG

## 2025-01-13 RX ORDER — PROPOFOL 10 MG/ML
VIAL (ML) INTRAVENOUS AS NEEDED
Status: DISCONTINUED | OUTPATIENT
Start: 2025-01-13 | End: 2025-01-13 | Stop reason: SURG

## 2025-01-13 RX ADMIN — ACETAMINOPHEN 1000 MG: 500 TABLET ORAL at 10:44

## 2025-01-13 RX ADMIN — FENTANYL CITRATE 100 MCG: 50 INJECTION, SOLUTION INTRAMUSCULAR; INTRAVENOUS at 13:14

## 2025-01-13 RX ADMIN — DEXAMETHASONE SODIUM PHOSPHATE 4 MG: 4 INJECTION, SOLUTION INTRAMUSCULAR; INTRAVENOUS at 13:26

## 2025-01-13 RX ADMIN — Medication 100 MCG: at 13:28

## 2025-01-13 RX ADMIN — SUGAMMADEX 200 MG: 100 INJECTION, SOLUTION INTRAVENOUS at 14:01

## 2025-01-13 RX ADMIN — CEFTAZIDIME 2000 MG: 2 INJECTION, POWDER, FOR SOLUTION INTRAVENOUS at 13:21

## 2025-01-13 RX ADMIN — PROPOFOL 200 MG: 10 INJECTION, EMULSION INTRAVENOUS at 13:14

## 2025-01-13 RX ADMIN — LIDOCAINE HYDROCHLORIDE 100 MG: 20 INJECTION, SOLUTION INTRAVENOUS at 13:14

## 2025-01-13 RX ADMIN — MIDAZOLAM HYDROCHLORIDE 2 MG: 1 INJECTION, SOLUTION INTRAMUSCULAR; INTRAVENOUS at 13:01

## 2025-01-13 RX ADMIN — ROCURONIUM BROMIDE 50 MG: 50 INJECTION INTRAVENOUS at 13:14

## 2025-01-13 RX ADMIN — SODIUM CHLORIDE, POTASSIUM CHLORIDE, SODIUM LACTATE AND CALCIUM CHLORIDE 9 ML/HR: 600; 310; 30; 20 INJECTION, SOLUTION INTRAVENOUS at 10:45

## 2025-01-13 RX ADMIN — ONDANSETRON 4 MG: 2 INJECTION INTRAMUSCULAR; INTRAVENOUS at 13:26

## 2025-01-13 RX ADMIN — HYDROMORPHONE HYDROCHLORIDE 0.5 MG: 1 INJECTION, SOLUTION INTRAMUSCULAR; INTRAVENOUS; SUBCUTANEOUS at 13:39

## 2025-01-13 NOTE — H&P
[facility]   Urology Preop H&P Note    Patient Name: Curtis Pereyra  : 1960  MRN: [id number]  Primary Care Physician:  Audelia Villafana APRN  Referring Physician: Sarah David MD  Date of admission: 2025    Subjective   Subjective     Reason for Consult/ Chief Complaint: Malignant neoplasm of overlapping sites of bladder [C67.8]    HPI:  Curtis Pereyra is a 64 y.o. male history ofMalignant neoplasm of overlapping sites of bladder [C67.8] who presents for further management OR.  Presents for planned Procedure(s):  CYSTOSCOPY TRANSURETHRAL RESECTION OF BLADDER TUMOR  Plain text: Bladder scope, resect tumor;  .   Risk, benefits, and alternatives discussed with patient prior to today.All questions were addressed after providing time for discussion.  Patient denies significant changes since last visit.  No new complaints today.  Preop urine culture resulted in bacteriuria, case postponed, patient placed on antibiotic.  Denies UTI symptoms.      _________  TURBT pathology 11/15/2024: HGT1 urothelial carcinoma    Review of Systems   All systems were reviewed and negative except for the above  Personal History     Past Medical History:   Diagnosis Date    Anxiety     Bladder cancer     BPH (benign prostatic hyperplasia)     Erectile dysfunction     Hypertension     Urinary incontinence Oct 24       Past Surgical History:   Procedure Laterality Date    COLONOSCOPY      TRANSURETHRAL RESECTION OF BLADDER TUMOR N/A 11/15/2024    Procedure: CYSTOSCOPY TRANSURETHRAL RESECTION OF BLADDER TUMOR  Plain text: Scope bladder, resect tumor;  Surgeon: Sarah David MD;  Location: [facility] OR;  Service: Urology;  Laterality: N/A;       Family History: family history includes Heart disease in his father. Otherwise pertinent FHx was reviewed and not pertinent to current issue.    Social History:  reports that he has quit smoking. His smoking use included cigarettes. He has never used smokeless tobacco.  He reports that he does not drink alcohol and does not use drugs.    Home Medications:  levoFLOXacin, lisinopril-hydrochlorothiazide, meloxicam, oxybutynin, pyridoxine, solifenacin, tamsulosin, venlafaxine XR, and vitamin B-12    Allergies:  No Known Allergies    Objective    Objective     Vitals:   Temp:  [96.3 °F (35.7 °C)] 96.3 °F (35.7 °C)  Heart Rate:  [96] 96  Resp:  [20] 20  BP: (147)/(96) 147/96    Physical Exam:   Constitutional: Awake, alert   Respiratory: Clear, nonlabored respirations    Cardiovascular: Regular rate, no chest retractions   gastrointestinal: Appears soft, nontender     Results:    Assessment & Plan   Assessment / Plan     Brief Patient Summary:  Curtis Pereyra is a 64 y.o. male who     Active Hospital Problems:  Active Hospital Problems    Diagnosis     **Malignant neoplasm of overlapping sites of bladder        Plan:   Proceed to the OR for planned procedure, Procedure(s):  CYSTOSCOPY TRANSURETHRAL RESECTION OF BLADDER TUMOR  Plain text: Bladder scope, resect tumor,  ,   Risk, benefits, and alternatives discussed with patient at length he is agreeable to proceed  All questions addressed      Electronically signed by Sarah David MD, 01/13/25, 11:18 AM EST.

## 2025-01-13 NOTE — DISCHARGE INSTRUCTIONS
DISCHARGE INSTRUCTIONS CYSTOSCOPY      For your surgery you had:  General anesthesia (you may have a sore throat for the first 24 hours)    You may experience dizziness, drowsiness, or lightheadedness for several hours following surgery.  Do not stay alone today or tonight.  Limit your activity for 24 hours.  You should not drive, operate machinery, drink alcohol, or sign legally binding documents for 24 hours or while you are taking pain medication.  Resume your diet slowly.  Follow any special dietary instructions you may have been given by your doctor.    NOTIFY YOUR DOCTOR IF YOU EXPERIENCE ANY OF THE FOLLOWING:  Temperature greater than 101 degrees Fahrenheit  Shaking Chills  Redness or excessive drainage from incision  Nausea, vomiting and/or pain that is not controlled by prescribed medications  Increase in bleeding or bleeding that is excessive  Unable to urinate in 6 hours after surgery  If unable to reach your doctor, please go to the closest Emergency Room   Following your cystoscopy exam, you may experience burning upon urination.  You may also pass some bloody urine.  If the burning sensation and/or bloody urine should persist beyond 48 hours, call your doctor.  To encourage kidney and bladder function, you should drink as much fluid as possible.  Medications per physician instructions as indicated on After Visit Summary Sheet.      Last dose of pain medication given at: TYLENOL 1000MG AT 10:44 DO NOT EXCEED 4000MG   .      SPECIAL INSTRUCTIONS:  SEE AFTER VISIT SUMMARY   *Follow ALL Dr. David's written and verbal instructions.  **Patient to have a void trial on Friday 1/17/25,  Urology office will call to schedule.  ***Remove Catheter at home on Thursday PM.

## 2025-01-13 NOTE — OP NOTE
Preoperative diagnosis  Bladder cancer    Postoperative diagnosis  Cancer    Procedure performed  Rigid cystoscopy; Transurethral resection of bladder tumor,  2-5cm (30899)    Surgeon  Sarah David MD      Anesthesia  General    EBL  0 mL    Complications  None    Specimen  Prior bladder tumor resection site  Deep margin prior resection site    Findings  Prostatomegaly with bilateral coapting lateral lobes, intravesical prostate tissue; no significant median lobe; normal-appearing bladder mucosa except at the left lateral wall, prior resection site where the mucosa appeared erythematous and friable; no distinct papillary features.  No discrete tumors on today's examination.  Normal-appearing left ureteral orifice.  Otherwise healthy appearing bladder and normal right hemitrigone.  Resection area completely resected and fulgurated, 5 cm area resected    Indications  64 y.o. male agreed to undergo the above named procedure after discussion of the alternatives, risks and benefits. Informed consent was obtained.      Procedure  After informed consent was obtained, the patient was taken back to the  operating suite where general anesthesia was administered.  Once the patient  was adequately anesthetized, he was placed in the dorsal lithotomy position.  His genitals were prepped and draped in the normal sterile fashion.  A rigid  cystoscope was inserted gently into the urethral meatus and passed along the  length of the urethra.  There were no strictures, stenosis, or bladder neck contracture;  the prostate was enlarged with coapting lateral lobes and intravesical tissue, no significant median lobe. Pan cystoscopy was performed in a 360-degree manner.  Upon inspection of the bladder, the patient's bladder mucosa appeared normal except at the left lateral wall.  This was the area of prior resection.  This area appeared edematous, erythematous and friable, some areas of tissue sloughing.  No discrete papillary  features.  No distinct tumors on today's inspection.  The remainder of the bladder appeared normal.  Left ureteral orifice was easily identified and outside the area of friability or resection.  Right normal-appearing UO.   Once pan cystoscopy was completed, the cystoscope was removed and a resectoscope was inserted.  The  Prior area of resection completely resected.  Muscle was noted to be in the resection bed after the completion of resection.  Deep margin was also taken from the prior resection bed.  Hemostasis was performed throughout the resection with electrocautery. The  Bessman was then irrigated out and passed for pathology.  Final inspection of the resection bed of the tumor revealed adequate  hemostasis.  Specimen was passed off to pathology.  The scope was removed with the bladder remaining full and a 18-[place] Bose  catheter was inserted into the patient's bladder without difficulty and  secured with 10 mL in the balloon.  At this point the procedure was deemed  terminated.  The patient was placed back in the supine position, awoken from  anesthesia, and transferred to the PACU in good condition.    Sign:  Electronically signed by Sarah David MD, 01/13/25, 2:41 PM EST.

## 2025-01-13 NOTE — ANESTHESIA PREPROCEDURE EVALUATION
Anesthesia Evaluation     Patient summary reviewed and Nursing notes reviewed   no history of anesthetic complications:   NPO Solid Status: > 8 hours  NPO Liquid Status: > 2 hours           Airway   Mallampati: II  TM distance: >3 FB  Neck ROM: full  Possible difficult intubation  Dental - normal exam     Pulmonary - normal exam    breath sounds clear to auscultation  (+) ,shortness of breath, sleep apnea  Cardiovascular - normal exam  Exercise tolerance: good (4-7 METS)    Rhythm: regular  Rate: normal    (+) hypertension well controlled      Neuro/Psych  (+) psychiatric history Anxiety  GI/Hepatic/Renal/Endo    (+) obesity, morbid obesity    Musculoskeletal     Abdominal   (+) obese   Substance History - negative use     OB/GYN negative ob/gyn ROS         Other   arthritis,   history of cancer    ROS/Med Hx Other: PAT Nursing Notes unavailable.       Phys Exam Other:  Full Beard                  Anesthesia Plan    ASA 3     general     (Patient understands anesthesia not responsible for dental damage.)  intravenous induction     Anesthetic plan, risks, benefits, and alternatives have been provided, discussed and informed consent has been obtained with: patient.  Pre-procedure education provided      CODE STATUS:

## 2025-01-13 NOTE — ANESTHESIA POSTPROCEDURE EVALUATION
Patient: Curtis QURESHI Sr Pereyra    Procedure Summary       Date: 01/13/25 Room / Location: formerly [facility] OR 07 / formerly [facility] MAIN OR    Anesthesia Start: 1309 Anesthesia Stop: 1409    Procedure: CYSTOSCOPY TRANSURETHRAL RESECTION OF BLADDER TUMOR  Plain text: Bladder scope, resect tumor Diagnosis:       Malignant neoplasm of overlapping sites of bladder      (Malignant neoplasm of overlapping sites of bladder [C67.8])    Surgeons: Sarah David MD Provider: Yazan Ramirez MD    Anesthesia Type: general ASA Status: 3            Anesthesia Type: general    Vitals  Vitals Value Taken Time   /84 01/13/25 1454   Temp 36.3 °C (97.4 °F) 01/13/25 1447   Pulse 91 01/13/25 1454   Resp 16 01/13/25 1447   SpO2 93 % 01/13/25 1454   Vitals shown include unfiled device data.        Post Anesthesia Care and Evaluation    Patient location during evaluation: bedside  Patient participation: complete - patient participated  Level of consciousness: awake  Pain management: adequate    Airway patency: patent  PONV Status: none  Cardiovascular status: acceptable and stable  Respiratory status: acceptable  Hydration status: acceptable

## 2025-01-17 ENCOUNTER — CLINICAL SUPPORT (OUTPATIENT)
Dept: UROLOGY | Age: 65
End: 2025-01-17
Payer: MEDICARE

## 2025-01-17 VITALS — RESPIRATION RATE: 17 BRPM | BODY MASS INDEX: 39.17 KG/M2 | HEIGHT: 75 IN | WEIGHT: 315 LBS

## 2025-01-17 DIAGNOSIS — C67.8 MALIGNANT NEOPLASM OF OVERLAPPING SITES OF BLADDER: Primary | ICD-10-CM

## 2025-01-17 NOTE — PROGRESS NOTES
Bladder Scan interpretation 01/17/2025    Estimation of residual urine via BVI 3000 Verathon Bladder Scan  MA/nurse performing: RUKHSANA Mcgrath RN  Residual Urine: 0 ml  Indication: S/P TURBT  Position: Supine  Examination: Incremental scanning of the suprapubic area using 2.0 MHz transducer using copious amounts of acoustic gel.   Findings: An anechoic area was demonstrated which represented the bladder, with measurement of residual urine as noted. I inspected this myself. In that the residual urine was stable or insignificant, refer to plan for treatment and plan necessary at this time.

## 2025-02-24 DIAGNOSIS — R31.9 HEMATURIA, UNSPECIFIED TYPE: ICD-10-CM

## 2025-02-24 DIAGNOSIS — R82.71 BACTERIURIA, ASYMPTOMATIC: Primary | ICD-10-CM

## 2025-02-26 ENCOUNTER — LAB (OUTPATIENT)
Facility: HOSPITAL | Age: 65
End: 2025-02-26
Payer: MEDICARE

## 2025-02-26 DIAGNOSIS — R82.71 BACTERIURIA, ASYMPTOMATIC: ICD-10-CM

## 2025-02-26 DIAGNOSIS — R31.9 HEMATURIA, UNSPECIFIED TYPE: ICD-10-CM

## 2025-02-26 LAB
BACTERIA UR QL AUTO: ABNORMAL /HPF
BILIRUB UR QL STRIP: NEGATIVE
CLARITY UR: CLEAR
COLOR UR: ABNORMAL
GLUCOSE UR STRIP-MCNC: NEGATIVE MG/DL
HGB UR QL STRIP.AUTO: NEGATIVE
HYALINE CASTS UR QL AUTO: ABNORMAL /LPF
KETONES UR QL STRIP: ABNORMAL
LEUKOCYTE ESTERASE UR QL STRIP.AUTO: ABNORMAL
NITRITE UR QL STRIP: NEGATIVE
PH UR STRIP.AUTO: 6 [PH] (ref 5–8)
PROT UR QL STRIP: ABNORMAL
RBC # UR STRIP: ABNORMAL /HPF
REF LAB TEST METHOD: ABNORMAL
SP GR UR STRIP: 1.03 (ref 1–1.03)
SQUAMOUS #/AREA URNS HPF: ABNORMAL /HPF
UROBILINOGEN UR QL STRIP: ABNORMAL
WBC # UR STRIP: ABNORMAL /HPF

## 2025-02-26 PROCEDURE — 81001 URINALYSIS AUTO W/SCOPE: CPT

## 2025-02-26 PROCEDURE — 87086 URINE CULTURE/COLONY COUNT: CPT

## 2025-02-27 LAB — BACTERIA SPEC AEROBE CULT: NO GROWTH

## 2025-03-05 ENCOUNTER — TELEPHONE (OUTPATIENT)
Dept: UROLOGY | Age: 65
End: 2025-03-05
Payer: COMMERCIAL

## 2025-03-05 NOTE — TELEPHONE ENCOUNTER
Call made to pt returning call from clarification; pt states got a VM stating needed to r/s procedure to Monday for antibx use; RN states that may have been an old message as his culture was clean and he is good to go; pt expresses understanding and has no further questions at this time.

## 2025-03-07 ENCOUNTER — CLINICAL SUPPORT (OUTPATIENT)
Dept: UROLOGY | Age: 65
End: 2025-03-07
Payer: MEDICARE

## 2025-03-07 DIAGNOSIS — C67.8 MALIGNANT NEOPLASM OF OVERLAPPING SITES OF BLADDER: Primary | ICD-10-CM

## 2025-03-07 DIAGNOSIS — N39.0 URINARY TRACT INFECTION IN MALE: ICD-10-CM

## 2025-03-07 LAB
BILIRUB BLD-MCNC: NEGATIVE MG/DL
CLARITY, POC: CLEAR
COLOR UR: YELLOW
EXPIRATION DATE: ABNORMAL
GLUCOSE UR STRIP-MCNC: NEGATIVE MG/DL
KETONES UR QL: NEGATIVE
LEUKOCYTE EST, POC: ABNORMAL
Lab: ABNORMAL
NITRITE UR-MCNC: NEGATIVE MG/ML
PH UR: 6 [PH] (ref 5–8)
PROT UR STRIP-MCNC: NEGATIVE MG/DL
RBC # UR STRIP: NEGATIVE /UL
SP GR UR: 1.02 (ref 1–1.03)
UROBILINOGEN UR QL: ABNORMAL

## 2025-03-07 RX ORDER — CLOTRIMAZOLE 1 G/ML
SOLUTION TOPICAL
COMMUNITY
Start: 2025-02-26

## 2025-03-07 NOTE — PROGRESS NOTES
Procedure   Insert Temp Indwelling Blad Cath, Simple    Date/Time: 3/7/2025 3:58 PM    Performed by: Aruna Mcgrath RN  Authorized by: Sarah David MD  Preparation: Patient was prepped and draped in the usual sterile fashion.  Local anesthesia used: no    Anesthesia:  Local anesthesia used: no    Sedation:  Patient sedated: no    Patient tolerance: patient tolerated the procedure well with no immediate complications  Comments: Patient in supine position.  Using sterile technique inserted 16fr silicone ramirez catheter.  Drained bladder and then instilled 25mg of BCG into the bladder.  Patient tolerated well. Ramirez balloon deflated and removed. Advised patient of after care instructions and gave handout with instructions on it.  Patient verbalized understanding with teach back.

## 2025-03-14 ENCOUNTER — CLINICAL SUPPORT (OUTPATIENT)
Dept: UROLOGY | Age: 65
End: 2025-03-14
Payer: MEDICARE

## 2025-03-14 VITALS — RESPIRATION RATE: 17 BRPM

## 2025-03-14 DIAGNOSIS — N39.0 URINARY TRACT INFECTION IN MALE: ICD-10-CM

## 2025-03-14 DIAGNOSIS — C67.8 MALIGNANT NEOPLASM OF OVERLAPPING SITES OF BLADDER: Primary | ICD-10-CM

## 2025-03-14 LAB
BILIRUB BLD-MCNC: NEGATIVE MG/DL
CLARITY, POC: CLEAR
COLOR UR: YELLOW
EXPIRATION DATE: ABNORMAL
GLUCOSE UR STRIP-MCNC: NEGATIVE MG/DL
KETONES UR QL: NEGATIVE
LEUKOCYTE EST, POC: NEGATIVE
Lab: ABNORMAL
NITRITE UR-MCNC: NEGATIVE MG/ML
PH UR: 5.5 [PH] (ref 5–8)
PROT UR STRIP-MCNC: ABNORMAL MG/DL
RBC # UR STRIP: ABNORMAL /UL
SP GR UR: 1.03 (ref 1–1.03)
UROBILINOGEN UR QL: ABNORMAL

## 2025-03-14 NOTE — PROGRESS NOTES
Procedure   Insert Temp Indwelling Blad Cath, Simple    Date/Time: 3/14/2025 3:25 PM    Performed by: Aruna Mcgrath RN  Authorized by: Sarah David MD  Preparation: Patient was prepped and draped in the usual sterile fashion.  Local anesthesia used: no    Anesthesia:  Local anesthesia used: no    Sedation:  Patient sedated: no    Patient tolerance: patient tolerated the procedure well with no immediate complications  Comments: Patient in supine position.  Using sterile technique inserted 16fr straight ramirez catheter.  Inserted 5-10ml of sterile water in to balloon. Drained bladder and then instilled 50mg of BCG into the bladder. Flushed catheter with 10ml of sterile water. Balloon deflated of 5-10ml of sterile water. Patient tolerated well.  Advised patient of after care instructions and gave handout with instructions on it.  Patient verbalized understanding with teach back.

## 2025-03-21 ENCOUNTER — CLINICAL SUPPORT (OUTPATIENT)
Dept: UROLOGY | Age: 65
End: 2025-03-21
Payer: MEDICARE

## 2025-03-21 DIAGNOSIS — C67.8 MALIGNANT NEOPLASM OF OVERLAPPING SITES OF BLADDER: ICD-10-CM

## 2025-03-21 DIAGNOSIS — N39.0 URINARY TRACT INFECTION IN MALE: Primary | ICD-10-CM

## 2025-03-21 LAB
BILIRUB BLD-MCNC: NEGATIVE MG/DL
CLARITY, POC: CLEAR
COLOR UR: YELLOW
EXPIRATION DATE: ABNORMAL
GLUCOSE UR STRIP-MCNC: NEGATIVE MG/DL
KETONES UR QL: NEGATIVE
LEUKOCYTE EST, POC: NEGATIVE
Lab: ABNORMAL
NITRITE UR-MCNC: NEGATIVE MG/ML
PH UR: 5.5 [PH] (ref 5–8)
PROT UR STRIP-MCNC: NEGATIVE MG/DL
RBC # UR STRIP: ABNORMAL /UL
SP GR UR: 1.03 (ref 1–1.03)
UROBILINOGEN UR QL: ABNORMAL

## 2025-03-21 NOTE — PROGRESS NOTES
Procedure   Insert Temp Indwelling Blad Cath, Simple    Date/Time: 3/21/2025 3:51 PM    Performed by: Aruna Mcgrath RN  Authorized by: Sarah David MD  Preparation: Patient was prepped and draped in the usual sterile fashion.  Local anesthesia used: no    Anesthesia:  Local anesthesia used: no    Sedation:  Patient sedated: no    Patient tolerance: patient tolerated the procedure well with no immediate complications  Comments: Pt presented today to get their 3rd BCG instillation; after urinalysis was performed and deemed adequate patient in supine position.  Using sterile technique inserted 14fr straight ramirez catheter. 5-10ml of sterile water inserted in to balloon. Drained bladder and then instilled 25mg of BCG into the bladder.  Patient tolerated well.  Balloon deflated of 5-10ml of sterile water and ramirez removed. Advised patient of after care instructions and gave handout with instructions on it.  Patient verbalized understanding with teach back.

## 2025-03-28 ENCOUNTER — CLINICAL SUPPORT (OUTPATIENT)
Dept: UROLOGY | Age: 65
End: 2025-03-28
Payer: MEDICARE

## 2025-03-28 DIAGNOSIS — C67.8 MALIGNANT NEOPLASM OF OVERLAPPING SITES OF BLADDER: ICD-10-CM

## 2025-03-28 DIAGNOSIS — N39.0 URINARY TRACT INFECTION IN MALE: Primary | ICD-10-CM

## 2025-03-28 LAB
BILIRUB BLD-MCNC: NEGATIVE MG/DL
CLARITY, POC: CLEAR
COLOR UR: YELLOW
EXPIRATION DATE: ABNORMAL
GLUCOSE UR STRIP-MCNC: NEGATIVE MG/DL
KETONES UR QL: ABNORMAL
LEUKOCYTE EST, POC: NEGATIVE
Lab: ABNORMAL
NITRITE UR-MCNC: NEGATIVE MG/ML
PH UR: 5.5 [PH] (ref 5–8)
PROT UR STRIP-MCNC: NEGATIVE MG/DL
RBC # UR STRIP: ABNORMAL /UL
SP GR UR: 1.03 (ref 1–1.03)
UROBILINOGEN UR QL: ABNORMAL

## 2025-03-28 NOTE — PROGRESS NOTES
Procedure   Insert Temp Indwelling Blad Cath, Simple    Date/Time: 3/28/2025 3:29 PM    Performed by: Aruna Mcgrath RN  Authorized by: Sarah David MD  Preparation: Patient was prepped and draped in the usual sterile fashion.  Local anesthesia used: no    Anesthesia:  Local anesthesia used: no    Sedation:  Patient sedated: no    Patient tolerance: patient tolerated the procedure well with no immediate complications  Comments: Patient in supine position.  Using sterile technique inserted 16fr straight ramirez catheter.  5-10ml of sterile water instilled in balloon. Drained bladder and then instilled 25mg of BCG into the bladder.  Patient tolerated well.  5-10ml of sterile water removed from balloon and catheter removed with no issues. Advised patient of after care instructions and gave handout with instructions on it.  Patient verbalized understanding with teach back.

## 2025-04-11 ENCOUNTER — CLINICAL SUPPORT (OUTPATIENT)
Dept: UROLOGY | Age: 65
End: 2025-04-11
Payer: MEDICARE

## 2025-04-11 DIAGNOSIS — C67.8 MALIGNANT NEOPLASM OF OVERLAPPING SITES OF BLADDER: Primary | ICD-10-CM

## 2025-04-11 DIAGNOSIS — N39.0 URINARY TRACT INFECTION IN MALE: ICD-10-CM

## 2025-04-11 NOTE — PROGRESS NOTES
Procedure   Insert Temp Indwelling Blad Cath, Simple    Date/Time: 4/11/2025 3:34 PM    Performed by: Aruna Mcgrath RN  Authorized by: Sarah David MD  Preparation: Patient was prepped and draped in the usual sterile fashion.  Local anesthesia used: no    Anesthesia:  Local anesthesia used: no    Sedation:  Patient sedated: no    Patient tolerance: patient tolerated the procedure well with no immediate complications  Comments: Patient in supine position.  Patient was draped and cleansed in normal sterile fashion.  After explanation of procedure, inserted 16 [place] straight ramirez cath and drained remaining urine in bladder. clear returned upon insertion.  Inflated balloon with 5-10 ml of sterile water. 25ml of BCG medication inserted in to bladder and 10ml of sterile water flushed after. Balloon inflated and catheter removed with no issues. Patient tolerated well. Advised patient of after care instructions and gave handout with instructions on it.  Patient verbalized understanding with teach back.

## 2025-04-18 ENCOUNTER — CLINICAL SUPPORT (OUTPATIENT)
Dept: UROLOGY | Age: 65
End: 2025-04-18
Payer: MEDICARE

## 2025-04-18 DIAGNOSIS — C67.8 MALIGNANT NEOPLASM OF OVERLAPPING SITES OF BLADDER: Primary | ICD-10-CM

## 2025-04-18 DIAGNOSIS — N39.0 URINARY TRACT INFECTION IN MALE: ICD-10-CM

## 2025-04-18 LAB
BILIRUB BLD-MCNC: NEGATIVE MG/DL
CLARITY, POC: CLEAR
COLOR UR: YELLOW
EXPIRATION DATE: ABNORMAL
GLUCOSE UR STRIP-MCNC: NEGATIVE MG/DL
KETONES UR QL: ABNORMAL
LEUKOCYTE EST, POC: NEGATIVE
Lab: ABNORMAL
NITRITE UR-MCNC: NEGATIVE MG/ML
PH UR: 5.5 [PH] (ref 5–8)
PROT UR STRIP-MCNC: NEGATIVE MG/DL
RBC # UR STRIP: NEGATIVE /UL
SP GR UR: 1.03 (ref 1–1.03)
UROBILINOGEN UR QL: ABNORMAL

## 2025-05-21 RX ORDER — CIPROFLOXACIN 500 MG/1
TABLET, FILM COATED ORAL
COMMUNITY
Start: 2025-03-31

## 2025-05-21 RX ORDER — METRONIDAZOLE 500 MG/1
1 TABLET ORAL EVERY 12 HOURS SCHEDULED
COMMUNITY
Start: 2025-03-31

## 2025-05-27 NOTE — PROGRESS NOTES
Preprocedure diagnosis  Bladder cancer    Postprocedure diagnosis  Same as above    Procedure  Flexible Cystourethroscopy    Attending surgeon  Sarah David MD    Anesthesia  2% lidocaine jelly intraurethrally    Complications  None    Indications  65 y.o. male undergoing a flexible cystoscopy for the above mentioned indications.  Presents for surveillance cystoscopy  Informed consent was obtained.      Did well with BCG; denies complaints today.  Continues to take Flomax.  Gets up at night but not particularly bothersome.  Denies sensation of incomplete emptying.  No UTI since last visit.    ____  Induction course of BCG completed 4/18/2025    TURBT pathology:  1/13/2025: Urothelial mucosa with acute and chronic inflammation               - No residual carcinoma               - Muscularis propria present    11/15/2024: HGT1 urothelial carcinoma     Findings  Severe prostatomegaly with coapting lateral lobes, sizable median lobe; cystoscopy revealed one right and left ureteral orifice in the normal anatomic position, prior resection site left lateral wall, with scar; no evidence of tumor recurrence; no trabeculations or diverticula    Procedure  The patient was placed in supine position and prepped and draped in sterile fashion with lidocaine jelly per urethra for anesthesia.  A timeout was performed.  The 14F flexible cystoscope was lubricated and gently placed through the urethra and into the bladder.  The bladder was completely visualized.  The cystoscope was retroflexed and the bladder neck visualized. Findings were as above. The scope was withdrawn and the procedure terminated.  The patient tolerated the procedure well.        Plan:  Tolerated procedure well.  Instructions provided.  Cystoscopic findings discussed with patient include prostatomegaly with median lobe; no evidence of tumor recurrence on today's visit     recommend continue Flomax; monitor for lower urinary tract symptoms.  Patient has  history of UTI; if this becomes a recurrent problem may need to consider outlet procedure.  He notes understanding of this.    Plan for routine surveillance cystoscopy bladder cancer surveillance 3 months  Patient to follow-up in 3 months, surveillance cystoscopy  All questions addressed

## 2025-05-28 ENCOUNTER — PROCEDURE VISIT (OUTPATIENT)
Dept: UROLOGY | Age: 65
End: 2025-05-28
Payer: MEDICARE

## 2025-05-28 VITALS — HEIGHT: 75 IN | RESPIRATION RATE: 16 BRPM | BODY MASS INDEX: 39.17 KG/M2 | WEIGHT: 315 LBS

## 2025-05-28 DIAGNOSIS — N40.1 BENIGN PROSTATIC HYPERPLASIA WITH LOWER URINARY TRACT SYMPTOMS, SYMPTOM DETAILS UNSPECIFIED: ICD-10-CM

## 2025-05-28 DIAGNOSIS — C67.8 MALIGNANT NEOPLASM OF OVERLAPPING SITES OF BLADDER: Primary | ICD-10-CM

## 2025-05-28 DIAGNOSIS — Z87.440 PERSONAL HISTORY OF URINARY (TRACT) INFECTIONS: ICD-10-CM

## 2025-05-28 LAB
BILIRUB BLD-MCNC: NEGATIVE MG/DL
CLARITY, POC: CLEAR
COLOR UR: YELLOW
EXPIRATION DATE: ABNORMAL
GLUCOSE UR STRIP-MCNC: NEGATIVE MG/DL
KETONES UR QL: NEGATIVE
LEUKOCYTE EST, POC: NEGATIVE
Lab: ABNORMAL
NITRITE UR-MCNC: NEGATIVE MG/ML
PH UR: 6.5 [PH] (ref 5–8)
PROT UR STRIP-MCNC: ABNORMAL MG/DL
RBC # UR STRIP: NEGATIVE /UL
SP GR UR: 1.03 (ref 1–1.03)
UROBILINOGEN UR QL: ABNORMAL

## 2025-05-28 RX ORDER — CIPROFLOXACIN AND DEXAMETHASONE 3; 1 MG/ML; MG/ML
SUSPENSION/ DROPS AURICULAR (OTIC)
COMMUNITY
Start: 2025-05-15

## 2025-08-28 ENCOUNTER — TELEPHONE (OUTPATIENT)
Dept: UROLOGY | Age: 65
End: 2025-08-28

## 2025-08-28 ENCOUNTER — PROCEDURE VISIT (OUTPATIENT)
Dept: UROLOGY | Age: 65
End: 2025-08-28
Payer: MEDICARE

## 2025-08-28 VITALS — BODY MASS INDEX: 40.12 KG/M2 | HEIGHT: 75 IN

## 2025-08-28 DIAGNOSIS — C67.8 MALIGNANT NEOPLASM OF OVERLAPPING SITES OF BLADDER: Primary | ICD-10-CM

## (undated) DEVICE — Device

## (undated) DEVICE — CYSTO PACK: Brand: MEDLINE INDUSTRIES, INC.

## (undated) DEVICE — TUBING, SUCTION, 1/4" X 10', STRAIGHT: Brand: MEDLINE

## (undated) DEVICE — CONTAINER,SPEC,PNEUM TUBE,4OZ,STRL PATH: Brand: MEDLINE

## (undated) DEVICE — MAT FLR ABS W/BLU/LINER 56X72IN WHT

## (undated) DEVICE — SKIN PREP TRAY W/CHG: Brand: MEDLINE INDUSTRIES, INC.

## (undated) DEVICE — GOWN,REINFORCE,POLY,SIRUS,BREATH SLV,XLG: Brand: MEDLINE

## (undated) DEVICE — GLOVE,SURG,SENSICARE SLT,LF,PF,7: Brand: MEDLINE

## (undated) DEVICE — HF-RESECTION ELECTRODE PLASMALOOP LOOP, MEDIUM, 24 FR., 12°/16°, PK TURIS: Brand: OLYMPUS

## (undated) DEVICE — MEDI-VAC NON-CONDUCTIVE SUCTION TUBING: Brand: CARDINAL HEALTH

## (undated) DEVICE — Y-TYPE TUR/BLADDER IRRIGATION SET, REGULATING CLAMP

## (undated) DEVICE — TOWEL,OR,DSP,ST,BLUE,STD,4/PK,20PK/CS: Brand: MEDLINE

## (undated) DEVICE — SOL IRR NACL 0.9PCT 3000ML

## (undated) DEVICE — TRAY,IRRIGATION,BULBSYRINGE,60ML,CSR,PVP: Brand: MEDLINE

## (undated) DEVICE — CATH FOL BARDEX 2WY 18F 30CC

## (undated) DEVICE — CATH FOL COUDE TIEMAN 2WY 18F 30CC

## (undated) DEVICE — SET, IRRIGATION CYSTO, Y-TYPE, 81": Brand: MEDLINE